# Patient Record
Sex: FEMALE | Race: WHITE | NOT HISPANIC OR LATINO | Employment: OTHER | URBAN - METROPOLITAN AREA
[De-identification: names, ages, dates, MRNs, and addresses within clinical notes are randomized per-mention and may not be internally consistent; named-entity substitution may affect disease eponyms.]

---

## 2024-04-19 ENCOUNTER — TELEPHONE (OUTPATIENT)
Dept: CARDIOLOGY | Facility: HOSPITAL | Age: 74
End: 2024-04-19
Payer: MEDICARE

## 2024-04-26 NOTE — TELEPHONE ENCOUNTER
Called and notified pt June appt is the soonest Dr. Chamorro can see her, advised if elevated BPs become symptomatic with headaches, dizzines, stroke-like symptoms then she should proceed to ED immediately for evaluation, otherwise for pt to keep a log of daily BPs and bring it with her to June 10th appt for Dr. Chamorro to address. And call us in the mean time if she has further questions or concerns.

## 2024-05-02 ENCOUNTER — OFFICE VISIT (OUTPATIENT)
Dept: PRIMARY CARE | Facility: CLINIC | Age: 74
End: 2024-05-02
Payer: MEDICARE

## 2024-05-02 VITALS
HEART RATE: 71 BPM | SYSTOLIC BLOOD PRESSURE: 124 MMHG | OXYGEN SATURATION: 98 % | BODY MASS INDEX: 41.15 KG/M2 | WEIGHT: 247 LBS | DIASTOLIC BLOOD PRESSURE: 88 MMHG | TEMPERATURE: 97.3 F | HEIGHT: 65 IN

## 2024-05-02 DIAGNOSIS — L98.9 SKIN LESION: ICD-10-CM

## 2024-05-02 DIAGNOSIS — E03.8 OTHER SPECIFIED HYPOTHYROIDISM: ICD-10-CM

## 2024-05-02 DIAGNOSIS — E78.2 MIXED HYPERLIPIDEMIA: ICD-10-CM

## 2024-05-02 DIAGNOSIS — E66.01 MORBID (SEVERE) OBESITY DUE TO EXCESS CALORIES (MULTI): ICD-10-CM

## 2024-05-02 DIAGNOSIS — E55.9 VITAMIN D DEFICIENCY: ICD-10-CM

## 2024-05-02 DIAGNOSIS — R73.09 ABNORMAL GLUCOSE: ICD-10-CM

## 2024-05-02 DIAGNOSIS — Z11.59 NEED FOR HEPATITIS C SCREENING TEST: ICD-10-CM

## 2024-05-02 DIAGNOSIS — I48.0 PAROXYSMAL ATRIAL FIBRILLATION (MULTI): Primary | ICD-10-CM

## 2024-05-02 PROBLEM — Z98.890 HISTORY OF CARDIAC RADIOFREQUENCY ABLATION: Status: ACTIVE | Noted: 2024-05-02

## 2024-05-02 PROCEDURE — 1160F RVW MEDS BY RX/DR IN RCRD: CPT | Performed by: INTERNAL MEDICINE

## 2024-05-02 PROCEDURE — 1036F TOBACCO NON-USER: CPT | Performed by: INTERNAL MEDICINE

## 2024-05-02 PROCEDURE — 99204 OFFICE O/P NEW MOD 45 MIN: CPT | Performed by: INTERNAL MEDICINE

## 2024-05-02 PROCEDURE — 1159F MED LIST DOCD IN RCRD: CPT | Performed by: INTERNAL MEDICINE

## 2024-05-02 RX ORDER — BUPROPION HYDROCHLORIDE 200 MG/1
1 TABLET, EXTENDED RELEASE ORAL 2 TIMES DAILY
COMMUNITY

## 2024-05-02 RX ORDER — CARISOPRODOL 350 MG/1
TABLET ORAL EVERY 6 HOURS
COMMUNITY

## 2024-05-02 RX ORDER — THYROID 60 MG/1
60 TABLET ORAL
COMMUNITY

## 2024-05-02 RX ORDER — DIAZEPAM 5 MG/1
5 TABLET ORAL 2 TIMES DAILY PRN
COMMUNITY
Start: 2023-12-03 | End: 2024-05-17 | Stop reason: WASHOUT

## 2024-05-02 RX ORDER — GUAIFENESIN 600 MG/1
TABLET, EXTENDED RELEASE ORAL EVERY 12 HOURS
COMMUNITY

## 2024-05-02 RX ORDER — CARVEDILOL 6.25 MG/1
6.25 TABLET ORAL
COMMUNITY
Start: 2024-04-03

## 2024-05-02 RX ORDER — DIPHENHYDRAMINE HCL 25 MG
TABLET ORAL
COMMUNITY

## 2024-05-02 RX ORDER — CLONAZEPAM 0.5 MG/1
0.25 TABLET ORAL NIGHTLY
COMMUNITY

## 2024-05-02 RX ORDER — MELATONIN 5 MG
CAPSULE ORAL EVERY 24 HOURS
COMMUNITY

## 2024-05-02 RX ORDER — LEVOCETIRIZINE DIHYDROCHLORIDE 5 MG/1
TABLET, FILM COATED ORAL EVERY 24 HOURS
COMMUNITY

## 2024-05-02 RX ORDER — DULOXETIN HYDROCHLORIDE 60 MG/1
1 CAPSULE, DELAYED RELEASE ORAL DAILY
COMMUNITY
Start: 2013-09-25

## 2024-05-02 RX ORDER — LIDOCAINE 560 MG/1
PATCH PERCUTANEOUS; TOPICAL; TRANSDERMAL
COMMUNITY
Start: 2023-12-03 | End: 2025-12-02

## 2024-05-02 RX ORDER — NALOXONE HYDROCHLORIDE 4 MG/.1ML
SPRAY NASAL
COMMUNITY
Start: 2023-12-03 | End: 2025-12-02

## 2024-05-02 RX ORDER — NYSTATIN 100000 [USP'U]/G
POWDER TOPICAL
COMMUNITY

## 2024-05-02 RX ORDER — FAMOTIDINE 20 MG/1
20 TABLET, FILM COATED ORAL
COMMUNITY

## 2024-05-02 RX ORDER — KETOROLAC TROMETHAMINE 10 MG/1
10 TABLET, FILM COATED ORAL EVERY 8 HOURS PRN
COMMUNITY
Start: 2022-05-28

## 2024-05-02 RX ORDER — ACETAMINOPHEN 500 MG
TABLET ORAL
COMMUNITY
Start: 2023-12-03 | End: 2025-12-02

## 2024-05-02 RX ORDER — FLUTICASONE PROPIONATE 50 MCG
SPRAY, SUSPENSION (ML) NASAL EVERY 24 HOURS
COMMUNITY
Start: 2021-08-26

## 2024-05-02 RX ORDER — MEDIUM CHAIN TRIGLYCERIDES 7.7KCAL/ML
OIL (ML) ORAL
COMMUNITY

## 2024-05-02 ASSESSMENT — ENCOUNTER SYMPTOMS
NUMBNESS: 0
COUGH: 0
VOMITING: 0
FEVER: 0
DIARRHEA: 0
PHOTOPHOBIA: 0
DIZZINESS: 0
FACIAL ASYMMETRY: 0
TROUBLE SWALLOWING: 0
SEIZURES: 0
EYE PAIN: 0
SINUS PAIN: 0
POLYPHAGIA: 0
ARTHRALGIAS: 0
HALLUCINATIONS: 0
SPEECH DIFFICULTY: 0
CONFUSION: 0
APPETITE CHANGE: 0
NAUSEA: 0
BACK PAIN: 0
STRIDOR: 0
SORE THROAT: 0
CONSTIPATION: 0
VOICE CHANGE: 0
NERVOUS/ANXIOUS: 0
NECK PAIN: 0
WHEEZING: 0
PALPITATIONS: 0
ABDOMINAL PAIN: 0
MYALGIAS: 0
HEMATURIA: 0
DYSURIA: 0
WOUND: 0
FATIGUE: 0
TREMORS: 0
HEADACHES: 0
JOINT SWELLING: 0
UNEXPECTED WEIGHT CHANGE: 0
CHEST TIGHTNESS: 0
BLOOD IN STOOL: 0
SHORTNESS OF BREATH: 0
SLEEP DISTURBANCE: 0
FREQUENCY: 0
COLOR CHANGE: 0
ACTIVITY CHANGE: 0
FLANK PAIN: 0
POLYDIPSIA: 0
WEAKNESS: 0
ADENOPATHY: 0

## 2024-05-02 NOTE — TELEPHONE ENCOUNTER
Patient contacted cardiology nursing office to provide update patient has returned to [place], had office visit with Dr. Lou today and blood pressure was 124/72.  Patient will maintain scheduled follow up 6/10/24.

## 2024-05-02 NOTE — ASSESSMENT & PLAN NOTE
Patient obesity is linked to genetic factor but she is able to maintain her weight and is doing effort to lose weight.

## 2024-05-02 NOTE — PROGRESS NOTES
Subjective   Patient ID: Mckayla Barron is a 74 y.o. female who presents for New Patient Visit (Concerns about hypertension ).    HPI   I am seeing patient first time for the concerns of Hypertension. She was in [place] for 18 months as her daughter had health issues and thankfully she is doing better. Patient developed mountain sickness and it increased her SBP to 200 but after returning back her BP is better and in the office today its 124/88.    Patient also have other problems and is on several different medications.    She also want to see a dermatology for skin lesion which lately raised.    Review of Systems   Constitutional:  Negative for activity change, appetite change, fatigue, fever and unexpected weight change.   HENT:  Negative for dental problem, ear discharge, hearing loss, nosebleeds, postnasal drip, sinus pain, sore throat, trouble swallowing and voice change.    Eyes:  Negative for photophobia, pain and visual disturbance.   Respiratory:  Negative for cough, chest tightness, shortness of breath, wheezing and stridor.    Cardiovascular:  Negative for chest pain, palpitations and leg swelling.   Gastrointestinal:  Negative for abdominal pain, blood in stool, constipation, diarrhea, nausea and vomiting.   Endocrine: Negative for polydipsia, polyphagia and polyuria.   Genitourinary:  Negative for decreased urine volume, dyspareunia, dysuria, flank pain, frequency, hematuria and urgency.   Musculoskeletal:  Negative for arthralgias, back pain, gait problem, joint swelling, myalgias and neck pain.   Skin:  Negative for color change, rash and wound.   Allergic/Immunologic: Negative for environmental allergies and food allergies.   Neurological:  Negative for dizziness, tremors, seizures, syncope, facial asymmetry, speech difficulty, weakness, numbness and headaches.   Hematological:  Negative for adenopathy.   Psychiatric/Behavioral:  Negative for behavioral problems, confusion, hallucinations,  "self-injury, sleep disturbance and suicidal ideas. The patient is not nervous/anxious.      Objective   /88   Pulse 71   Temp 36.3 °C (97.3 °F)   Ht 1.638 m (5' 4.5\")   Wt 112 kg (247 lb)   SpO2 98%   BMI 41.74 kg/m²     Physical Exam  Vitals and nursing note reviewed.   Constitutional:       General: She is not in acute distress.     Appearance: Normal appearance. She is not ill-appearing or toxic-appearing.   HENT:      Head: Normocephalic and atraumatic.      Nose: Nose normal.   Eyes:      General:         Right eye: No discharge.         Left eye: No discharge.      Extraocular Movements: Extraocular movements intact.      Conjunctiva/sclera: Conjunctivae normal.      Pupils: Pupils are equal, round, and reactive to light.   Cardiovascular:      Rate and Rhythm: Normal rate and regular rhythm.      Pulses: Normal pulses.      Heart sounds: Normal heart sounds. No murmur heard.     No gallop.   Pulmonary:      Effort: Pulmonary effort is normal. No respiratory distress.      Breath sounds: Normal breath sounds. No stridor. No wheezing or rales.   Abdominal:      General: Bowel sounds are normal. There is no distension.      Palpations: Abdomen is soft.      Tenderness: There is no abdominal tenderness. There is no right CVA tenderness, left CVA tenderness, guarding or rebound.   Musculoskeletal:         General: No swelling or deformity. Normal range of motion.      Cervical back: Normal range of motion and neck supple. No rigidity or tenderness.      Right lower leg: No edema.      Left lower leg: No edema.   Skin:     General: Skin is warm.      Coloration: Skin is not jaundiced.      Findings: No bruising, erythema or rash.   Neurological:      General: No focal deficit present.      Mental Status: She is alert and oriented to person, place, and time.      Cranial Nerves: No cranial nerve deficit.      Gait: Gait normal.   Psychiatric:         Mood and Affect: Mood normal.         Behavior: " Behavior normal.         Thought Content: Thought content normal.         Judgment: Judgment normal.       Assessment/Plan   Problem List Items Addressed This Visit          Cardiac and Vasculature    Paroxysmal atrial fibrillation (Multi) - Primary    Relevant Medications    carvedilol (Coreg) 6.25 mg tablet    rivaroxaban (Xarelto) 20 mg tablet    Other Relevant Orders    CBC and Auto Differential    Comprehensive Metabolic Panel    Mixed hyperlipidemia    Relevant Orders    Lipid Panel       Endocrine/Metabolic    Morbid (severe) obesity due to excess calories (Multi)     Patient obesity is linked to genetic factor but she is able to maintain her weight and is doing effort to lose weight.         Other specified hypothyroidism    Relevant Medications    thyroid, pork, (Linden) 60 mg tablet    Other Relevant Orders    TSH with reflex to Free T4 if abnormal     Other Visit Diagnoses       Skin lesion        Relevant Orders    Referral to Dermatology    Abnormal glucose        Relevant Orders    Hemoglobin A1C    Vitamin D deficiency        Relevant Orders    Vitamin D 25-Hydroxy,Total (for eval of Vitamin D levels)    Need for hepatitis C screening test        Relevant Orders    Hepatitis C Antibody

## 2024-05-03 ENCOUNTER — LAB (OUTPATIENT)
Dept: LAB | Facility: LAB | Age: 74
End: 2024-05-03
Payer: MEDICARE

## 2024-05-03 DIAGNOSIS — I48.0 PAROXYSMAL ATRIAL FIBRILLATION (MULTI): ICD-10-CM

## 2024-05-03 DIAGNOSIS — R73.09 ABNORMAL GLUCOSE: ICD-10-CM

## 2024-05-03 DIAGNOSIS — E78.2 MIXED HYPERLIPIDEMIA: ICD-10-CM

## 2024-05-03 DIAGNOSIS — E55.9 VITAMIN D DEFICIENCY: ICD-10-CM

## 2024-05-03 DIAGNOSIS — Z11.59 NEED FOR HEPATITIS C SCREENING TEST: ICD-10-CM

## 2024-05-03 DIAGNOSIS — E03.8 OTHER SPECIFIED HYPOTHYROIDISM: ICD-10-CM

## 2024-05-03 LAB
25(OH)D3 SERPL-MCNC: 46 NG/ML (ref 30–100)
ALBUMIN SERPL BCP-MCNC: 4.3 G/DL (ref 3.4–5)
ALP SERPL-CCNC: 72 U/L (ref 33–136)
ALT SERPL W P-5'-P-CCNC: 15 U/L (ref 7–45)
ANION GAP SERPL CALC-SCNC: 13 MMOL/L (ref 10–20)
AST SERPL W P-5'-P-CCNC: 21 U/L (ref 9–39)
BASOPHILS # BLD AUTO: 0.04 X10*3/UL (ref 0–0.1)
BASOPHILS NFR BLD AUTO: 0.8 %
BILIRUB SERPL-MCNC: 0.7 MG/DL (ref 0–1.2)
BUN SERPL-MCNC: 19 MG/DL (ref 6–23)
CALCIUM SERPL-MCNC: 9.6 MG/DL (ref 8.6–10.3)
CHLORIDE SERPL-SCNC: 103 MMOL/L (ref 98–107)
CHOLEST SERPL-MCNC: 282 MG/DL (ref 0–199)
CHOLESTEROL/HDL RATIO: 7
CO2 SERPL-SCNC: 30 MMOL/L (ref 21–32)
CREAT SERPL-MCNC: 0.93 MG/DL (ref 0.5–1.05)
EGFRCR SERPLBLD CKD-EPI 2021: 64 ML/MIN/1.73M*2
EOSINOPHIL # BLD AUTO: 0.16 X10*3/UL (ref 0–0.4)
EOSINOPHIL NFR BLD AUTO: 3.2 %
ERYTHROCYTE [DISTWIDTH] IN BLOOD BY AUTOMATED COUNT: 13 % (ref 11.5–14.5)
EST. AVERAGE GLUCOSE BLD GHB EST-MCNC: 137 MG/DL
GLUCOSE SERPL-MCNC: 148 MG/DL (ref 74–99)
HBA1C MFR BLD: 6.4 %
HCT VFR BLD AUTO: 46.6 % (ref 36–46)
HCV AB SER QL: NONREACTIVE
HDLC SERPL-MCNC: 40.1 MG/DL
HGB BLD-MCNC: 15 G/DL (ref 12–16)
IMM GRANULOCYTES # BLD AUTO: 0.01 X10*3/UL (ref 0–0.5)
IMM GRANULOCYTES NFR BLD AUTO: 0.2 % (ref 0–0.9)
LDLC SERPL CALC-MCNC: 181 MG/DL
LYMPHOCYTES # BLD AUTO: 1.94 X10*3/UL (ref 0.8–3)
LYMPHOCYTES NFR BLD AUTO: 38.9 %
MCH RBC QN AUTO: 30.2 PG (ref 26–34)
MCHC RBC AUTO-ENTMCNC: 32.2 G/DL (ref 32–36)
MCV RBC AUTO: 94 FL (ref 80–100)
MONOCYTES # BLD AUTO: 0.47 X10*3/UL (ref 0.05–0.8)
MONOCYTES NFR BLD AUTO: 9.4 %
NEUTROPHILS # BLD AUTO: 2.37 X10*3/UL (ref 1.6–5.5)
NEUTROPHILS NFR BLD AUTO: 47.5 %
NON HDL CHOLESTEROL: 242 MG/DL (ref 0–149)
NRBC BLD-RTO: 0 /100 WBCS (ref 0–0)
PLATELET # BLD AUTO: 243 X10*3/UL (ref 150–450)
POTASSIUM SERPL-SCNC: 4.1 MMOL/L (ref 3.5–5.3)
PROT SERPL-MCNC: 6.6 G/DL (ref 6.4–8.2)
RBC # BLD AUTO: 4.97 X10*6/UL (ref 4–5.2)
SODIUM SERPL-SCNC: 142 MMOL/L (ref 136–145)
TRIGL SERPL-MCNC: 303 MG/DL (ref 0–149)
TSH SERPL-ACNC: 1.85 MIU/L (ref 0.44–3.98)
VLDL: 61 MG/DL (ref 0–40)
WBC # BLD AUTO: 5 X10*3/UL (ref 4.4–11.3)

## 2024-05-03 PROCEDURE — 80061 LIPID PANEL: CPT

## 2024-05-03 PROCEDURE — 82306 VITAMIN D 25 HYDROXY: CPT

## 2024-05-03 PROCEDURE — 83036 HEMOGLOBIN GLYCOSYLATED A1C: CPT

## 2024-05-03 PROCEDURE — 80053 COMPREHEN METABOLIC PANEL: CPT

## 2024-05-03 PROCEDURE — 84443 ASSAY THYROID STIM HORMONE: CPT

## 2024-05-03 PROCEDURE — 86803 HEPATITIS C AB TEST: CPT

## 2024-05-03 PROCEDURE — 36415 COLL VENOUS BLD VENIPUNCTURE: CPT

## 2024-05-03 PROCEDURE — 85025 COMPLETE CBC W/AUTO DIFF WBC: CPT

## 2024-05-17 ENCOUNTER — OFFICE VISIT (OUTPATIENT)
Dept: PRIMARY CARE | Facility: CLINIC | Age: 74
End: 2024-05-17
Payer: MEDICARE

## 2024-05-17 VITALS
HEART RATE: 80 BPM | TEMPERATURE: 97.1 F | OXYGEN SATURATION: 98 % | DIASTOLIC BLOOD PRESSURE: 84 MMHG | SYSTOLIC BLOOD PRESSURE: 124 MMHG

## 2024-05-17 DIAGNOSIS — R42 DIZZINESS: ICD-10-CM

## 2024-05-17 DIAGNOSIS — J30.2 SEASONAL ALLERGIES: ICD-10-CM

## 2024-05-17 DIAGNOSIS — R73.03 PRE-DIABETES: Primary | ICD-10-CM

## 2024-05-17 DIAGNOSIS — E78.2 MIXED HYPERLIPIDEMIA: ICD-10-CM

## 2024-05-17 PROCEDURE — 1160F RVW MEDS BY RX/DR IN RCRD: CPT | Performed by: INTERNAL MEDICINE

## 2024-05-17 PROCEDURE — 99214 OFFICE O/P EST MOD 30 MIN: CPT | Performed by: INTERNAL MEDICINE

## 2024-05-17 PROCEDURE — 1036F TOBACCO NON-USER: CPT | Performed by: INTERNAL MEDICINE

## 2024-05-17 PROCEDURE — 1159F MED LIST DOCD IN RCRD: CPT | Performed by: INTERNAL MEDICINE

## 2024-05-17 RX ORDER — DICYCLOMINE HYDROCHLORIDE 10 MG/1
CAPSULE ORAL
COMMUNITY
Start: 2024-05-03

## 2024-05-17 RX ORDER — EZETIMIBE 10 MG/1
10 TABLET ORAL DAILY
Qty: 30 TABLET | Refills: 5 | Status: SHIPPED | OUTPATIENT
Start: 2024-05-17 | End: 2024-11-13

## 2024-05-17 RX ORDER — ALBUTEROL SULFATE 90 UG/1
2 AEROSOL, METERED RESPIRATORY (INHALATION) EVERY 4 HOURS PRN
Qty: 8 G | Refills: 2 | Status: SHIPPED | OUTPATIENT
Start: 2024-05-17 | End: 2025-05-17

## 2024-05-17 ASSESSMENT — ENCOUNTER SYMPTOMS
TREMORS: 0
WEAKNESS: 0
BACK PAIN: 0
PALPITATIONS: 0
VOMITING: 0
NUMBNESS: 0
SHORTNESS OF BREATH: 0
NAUSEA: 0
FACIAL ASYMMETRY: 0
CONFUSION: 0
SORE THROAT: 0
SLEEP DISTURBANCE: 0
HEADACHES: 1
COUGH: 0
ADENOPATHY: 0
FLANK PAIN: 0
STRIDOR: 0
DIARRHEA: 0
FATIGUE: 0
WHEEZING: 1
CONSTIPATION: 0
FREQUENCY: 0
COLOR CHANGE: 0
POLYPHAGIA: 0
ARTHRALGIAS: 0
TROUBLE SWALLOWING: 0
RHINORRHEA: 1
NECK PAIN: 0
SPEECH DIFFICULTY: 0
MYALGIAS: 0
SINUS PAIN: 0
ACTIVITY CHANGE: 0
DYSURIA: 0
NERVOUS/ANXIOUS: 0
ABDOMINAL PAIN: 0
FEVER: 0
PHOTOPHOBIA: 0
WOUND: 0
CHEST TIGHTNESS: 0
SEIZURES: 0
UNEXPECTED WEIGHT CHANGE: 0
BLOOD IN STOOL: 0
JOINT SWELLING: 0
POLYDIPSIA: 0
APPETITE CHANGE: 0
DIZZINESS: 1
HALLUCINATIONS: 0
HEMATURIA: 0
VOICE CHANGE: 0
EYE PAIN: 0

## 2024-05-17 NOTE — PROGRESS NOTES
Subjective   Patient ID: Mckayla Barron is a 74 y.o. female who presents for Follow-up and Headache.    HPI   Patient presented to the office for follow up on labs.  She has    Mixed hyperlipidemia with LDL level of 181 and TG of 303. Last LDL level was 114 checked 4 years ago.  There is a sharp increase and patient is not on any medicine. She said she cannot tolerate statin because of side effects but is ok with alternative.    Her hemoglobin A1c level is 6.4    She is also experiencing symptoms of seasonal allergies and used her  albuterol inhaler last night.    Review of Systems   Constitutional:  Negative for activity change, appetite change, fatigue, fever and unexpected weight change.   HENT:  Positive for congestion and rhinorrhea. Negative for dental problem, ear discharge, hearing loss, nosebleeds, postnasal drip, sinus pain, sore throat, trouble swallowing and voice change.    Eyes:  Negative for photophobia, pain and visual disturbance.   Respiratory:  Positive for wheezing. Negative for cough, chest tightness, shortness of breath and stridor.    Cardiovascular:  Negative for chest pain, palpitations and leg swelling.   Gastrointestinal:  Negative for abdominal pain, blood in stool, constipation, diarrhea, nausea and vomiting.   Endocrine: Negative for polydipsia, polyphagia and polyuria.   Genitourinary:  Negative for decreased urine volume, dyspareunia, dysuria, flank pain, frequency, hematuria and urgency.   Musculoskeletal:  Negative for arthralgias, back pain, gait problem, joint swelling, myalgias and neck pain.   Skin:  Negative for color change, rash and wound.   Allergic/Immunologic: Negative for environmental allergies and food allergies.   Neurological:  Positive for dizziness and headaches. Negative for tremors, seizures, syncope, facial asymmetry, speech difficulty, weakness and numbness.   Hematological:  Negative for adenopathy.   Psychiatric/Behavioral:  Negative for behavioral  problems, confusion, hallucinations, self-injury, sleep disturbance and suicidal ideas. The patient is not nervous/anxious.      Objective   /84   Pulse 80   Temp 36.2 °C (97.1 °F)   SpO2 98%     Physical Exam  Vitals and nursing note reviewed.   Constitutional:       General: She is not in acute distress.     Appearance: She is obese. She is not ill-appearing or toxic-appearing.   HENT:      Head: Normocephalic.      Nose: Nose normal. No congestion.   Eyes:      General:         Right eye: No discharge.         Left eye: No discharge.      Conjunctiva/sclera: Conjunctivae normal.      Pupils: Pupils are equal, round, and reactive to light.   Cardiovascular:      Rate and Rhythm: Normal rate and regular rhythm.      Pulses: Normal pulses.      Heart sounds: Normal heart sounds. No murmur heard.  Pulmonary:      Effort: Pulmonary effort is normal. No respiratory distress.      Breath sounds: No stridor. Wheezing present. No rhonchi or rales.   Musculoskeletal:         General: No swelling or deformity. Normal range of motion.      Cervical back: Normal range of motion.   Skin:     General: Skin is warm.      Coloration: Skin is not jaundiced.      Findings: No erythema or rash.   Neurological:      General: No focal deficit present.      Mental Status: She is alert and oriented to person, place, and time.      Cranial Nerves: No cranial nerve deficit.      Gait: Gait normal.   Psychiatric:         Mood and Affect: Mood normal.         Behavior: Behavior normal.         Thought Content: Thought content normal.         Judgment: Judgment normal.       Assessment/Plan   Problem List Items Addressed This Visit          Cardiac and Vasculature    Mixed hyperlipidemia     Lifestyle modification as discussed.         Relevant Medications    ezetimibe (Zetia) 10 mg tablet    Other Relevant Orders    Lipid Panel       ENT    Seasonal allergies    Relevant Medications    albuterol (Ventolin HFA) 90 mcg/actuation  inhaler       Endocrine/Metabolic    Pre-diabetes - Primary     Dietary control, weight loss and daily exercise as discussed. Check your A1c level again in 4 months.         Relevant Orders    Hemoglobin A1C       Symptoms and Signs    Dizziness    Relevant Orders    Vascular US carotid artery duplex bilateral

## 2024-05-17 NOTE — ASSESSMENT & PLAN NOTE
Dietary control, weight loss and daily exercise as discussed. Check your A1c level again in 4 months.

## 2024-05-21 ENCOUNTER — HOSPITAL ENCOUNTER (OUTPATIENT)
Dept: VASCULAR MEDICINE | Facility: HOSPITAL | Age: 74
Discharge: HOME | End: 2024-05-21
Payer: MEDICARE

## 2024-05-21 DIAGNOSIS — R42 DIZZINESS: ICD-10-CM

## 2024-05-21 PROCEDURE — 93880 EXTRACRANIAL BILAT STUDY: CPT | Performed by: SURGERY

## 2024-05-21 PROCEDURE — 93880 EXTRACRANIAL BILAT STUDY: CPT

## 2024-05-28 RX ORDER — NAPROXEN SODIUM 220 MG
220 TABLET ORAL EVERY 12 HOURS
COMMUNITY

## 2024-05-28 RX ORDER — CETIRIZINE HYDROCHLORIDE 10 MG/1
TABLET ORAL EVERY 24 HOURS
COMMUNITY

## 2024-05-28 RX ORDER — RISPERIDONE 0.25 MG/1
0.25 TABLET ORAL 2 TIMES DAILY
COMMUNITY

## 2024-05-28 RX ORDER — TIZANIDINE 2 MG/1
2 TABLET ORAL EVERY 8 HOURS PRN
COMMUNITY
Start: 2023-12-03 | End: 2025-12-02

## 2024-05-29 ENCOUNTER — OFFICE VISIT (OUTPATIENT)
Dept: CARDIOLOGY | Facility: CLINIC | Age: 74
End: 2024-05-29
Payer: MEDICARE

## 2024-05-29 VITALS
DIASTOLIC BLOOD PRESSURE: 81 MMHG | HEIGHT: 66 IN | OXYGEN SATURATION: 97 % | WEIGHT: 253.31 LBS | HEART RATE: 65 BPM | SYSTOLIC BLOOD PRESSURE: 115 MMHG | BODY MASS INDEX: 40.71 KG/M2

## 2024-05-29 DIAGNOSIS — I48.0 PAROXYSMAL ATRIAL FIBRILLATION (MULTI): Primary | ICD-10-CM

## 2024-05-29 PROCEDURE — 93005 ELECTROCARDIOGRAM TRACING: CPT | Performed by: INTERNAL MEDICINE

## 2024-05-29 PROCEDURE — 99214 OFFICE O/P EST MOD 30 MIN: CPT | Performed by: INTERNAL MEDICINE

## 2024-05-29 ASSESSMENT — COLUMBIA-SUICIDE SEVERITY RATING SCALE - C-SSRS
1. IN THE PAST MONTH, HAVE YOU WISHED YOU WERE DEAD OR WISHED YOU COULD GO TO SLEEP AND NOT WAKE UP?: NO
6. HAVE YOU EVER DONE ANYTHING, STARTED TO DO ANYTHING, OR PREPARED TO DO ANYTHING TO END YOUR LIFE?: NO
2. HAVE YOU ACTUALLY HAD ANY THOUGHTS OF KILLING YOURSELF?: NO

## 2024-05-29 ASSESSMENT — PAIN SCALES - GENERAL: PAINLEVEL: 4

## 2024-05-29 NOTE — PROGRESS NOTES
Primary Care Physician: Robinson Lou MD  Date of Visit: 05/29/2024  1:00 PM EDT  Location of visit: [facility] [place] [address]     Chief Complaint:   No chief complaint on file.       HPI / Summary:   Mckayla Barron is a 74 y.o. female presents for followup.     2011 PVI, elevated BMI, htn, dl, depresssion, PTSD, anxiety, KESHA, lumbar radic, tremor, developed elevated bp at altitude in [place] . Went to er told to get off mountain. Watch shows 2-3 % afib/week.  Recent labs reviewed, lipids worsened, started on ezetimibe, adverse rxn to 4-5 statins.  A1c 6.4  Specialty Problems          Cardiology Problems    History of cardiac radiofrequency ablation    Mixed hyperlipidemia    Paroxysmal atrial fibrillation (Multi)        Past Medical History:   Diagnosis Date    Abnormal weight gain     Weight gain    Depression, unspecified 01/05/2015    Depression    Disorder of thyroid, unspecified     Thyroid trouble    Hypertension     Other specified hypothyroidism 01/05/2015    Secondary hypothyroidism    Personal history of other diseases of the circulatory system     History of cardiac disorder    Personal history of other diseases of the digestive system     History of dental problems    Personal history of other diseases of the nervous system and sense organs     History of tinnitus    Tremor, unspecified     Occasional tremors          Past Surgical History:   Procedure Laterality Date    ANKLE SURGERY  07/21/2014    Ankle Surgery    CHOLECYSTECTOMY  07/21/2014    Cholecystectomy    COLON SURGERY  09/28/2015    Colon Surgery    COLONOSCOPY W/ POLYPECTOMY  07/21/2014    Complete Colonoscopy For Polyp Removal    DILATION AND CURETTAGE OF UTERUS  07/21/2014    Dilation And Curettage    HERNIA REPAIR  07/21/2014    Inguinal Hernia Repair    HYSTERECTOMY  07/21/2014    Hysterectomy    KNEE SURGERY  07/21/2014    Knee Surgery    MOUTH SURGERY  07/21/2014    Oral Surgery Tooth Extraction    MR CHEST ANGIO W IV CONTRAST  3/14/2012    MR  CHEST ANGIO W IV CONTRAST 3[place]14/2012 [facility]    OTHER SURGICAL HISTORY  07/21/2014    Ant Spinal Diskect Osteophytect Lumb Interspace Microdiscect          Social History:   reports that she has never smoked. She has never used smokeless tobacco. She reports that she does not drink alcohol and does not use drugs.      Allergies:  Allergies   Allergen Reactions    Hydroxyzine Pamoate Shortness of breath    Mushroom Flavor Anaphylaxis     Mushroom allergy    Shellfish Derived Anaphylaxis    Acetaminophen Other     CAUSES LIVER TENDERNESS AND RARLEY TAKES IT    Liver tenderness    Aspirin Other     Increases tinnitus    Meperidine (Pf) Hallucinations     RESPIRATORY DIFFICULTIES, HALLUCINATIONS    Nsaids (Non-Steroidal Anti-Inflammatory Drug) Other     LIVER TENDERNESS AND SWELLING    Liver tenderness and swelling    Nystatin Other    Primidone Dizziness and Headache     Dizziness, nausea, headache    Statins-Hmg-Coa Reductase Inhibitors Myalgia     CAUSED LIVER TENDERNESS AND MUSCLE PAIN    Muscle spams and cramping    Codeine Rash and Hives     Rash, hallucination    Latex Other and Rash     Skin blisters    Oxycodone-Acetaminophen Other, Hallucinations, Hives and Rash     HALLUCINATIONS    Rash, hallucination    Povidone-Iodine Rash     BLISTERS    Red Dye Hives and Rash       Outpatient Medications:  Current Outpatient Medications   Medication Instructions    acetaminophen (Tylenol) 500 mg tablet Take 1 to 2 tablets by mouth every 6 hours as needed for pain or fever. Do not exceed 6 tablets in 24 hours    albuterol (Ventolin HFA) 90 mcg/actuation inhaler 2 puffs, inhalation, Every 4 hours PRN    BACILLUS COAGULANS-INULIN ORAL oral    buPROPion SR (Wellbutrin SR) 200 mg 12 hr tablet 1 tablet, oral, 2 times daily    carisoprodol (Soma) 350 mg tablet Every 6 hours    carvedilol (COREG) 6.25 mg, oral    cetirizine (ZyrTEC) 10 mg tablet oral, Every 24 hours    clonazePAM (KLONOPIN) 0.25 mg, oral, Nightly     dicyclomine (Bentyl) 10 mg capsule 1 CAP ORALLY 3 TIMES A DAY AS NEEDED FOR 30 DAY(S)    diphenhydrAMINE (Benadryl Allergy) 25 mg tablet oral    DULoxetine (Cymbalta) 60 mg DR capsule 1 capsule, oral, Daily    ezetimibe (ZETIA) 10 mg, oral, Daily    famotidine (PEPCID) 20 mg, oral    fluticasone (Flonase Allergy Relief) 50 mcg/actuation nasal spray Every 24 hours    guaiFENesin (Mucinex) 600 mg 12 hr tablet Every 12 hours    ketorolac (TORADOL) 10 mg, oral, Every 8 hours PRN    levocetirizine (Xyzal) 5 mg tablet Every 24 hours    lidocaine 4 % patch Apply 1 patch to the affected area(s) every 8 hours    medium chain triglycerides, MCT, oil (MCT Oil) 7.7 kcal/mL oil oral    melatonin 5 mg capsule Every 24 hours    naloxone (Narcan) 4 mg/0.1 mL nasal spray For Recipient Use. Use 1 Spray in one nostril. Seek immediate emergency medical help. If response is not obtained after 2 or 3 minutes, administer 2nd dose using new device in other nostril until emergency help arrives. Emergency use only    naproxen sodium (ALEVE) 220 mg, oral, Every 12 hours    nystatin (Mycostatin) 100,000 unit/gram powder APPLY TO THE AFFECTED AREA 3 TIMES DAILY.    risperiDONE (RISPERDAL) 0.25 mg, oral, 2 times daily    rivaroxaban (Xarelto) 20 mg tablet 1 tablet, oral, Daily    rivaroxaban (XARELTO) 20 mg, oral, Daily with evening meal, Take with food.    thyroid (pork) (ARMOUR) 60 mg, oral, Daily before breakfast    tiZANidine (ZANAFLEX) 2 mg, oral, Every 8 hours PRN       ROS     Physical Exam:  There were no vitals filed for this visit.  Wt Readings from Last 5 Encounters:   05/02/24 112 kg (247 lb)   08/29/22 108 kg (238 lb 6.4 oz)   08/11/22 107 kg (235 lb 6.4 oz)   07/15/22 107 kg (236 lb 12.8 oz)   06/28/22 109 kg (239 lb 8 oz)     There is no height or weight on file to calculate BMI.     Well developed well-nourished female.  Normal carotid upstrokes no bruits.  Flat JVP.  Manual cuff blood pressure 124/80.  Regular rhythm no gallop  "no murmur.  Clear lungs without crackles.  Soft abdomen without masses.  No dependent edema with intact pedal pulses  Last Labs:  CMP:  Recent Labs     05/03/24  0845 01/28/21 1946 11/09/20  1559 07/23/19  0905 06/21/18  1353    144 142 143 143   K 4.1 3.8 3.9 3.7 4.1    105 103 103 103   CO2 30 30 29 26 25   ANIONGAP 13 9 10 14 15   BUN 19 20 20 19 16   CREATININE 0.93 0.7 0.7 0.7 0.7   EGFR 64 88 88 88 88   GLUCOSE 148* 101* 110* 108* 101*     Recent Labs     05/03/24  0845 06/21/18  1353   ALBUMIN 4.3 4.0   ALKPHOS 72 83   ALT 15 19   AST 21 31   BILITOT 0.7 0.2     CBC:  Recent Labs     05/03/24  0845 01/28/21 1946 11/09/20  1559 07/23/19  0905 06/21/18  1353   WBC 5.0 5.1 6.2 4.8 4.8   HGB 15.0 13.7 14.3 13.4 13.5   HCT 46.6* 43.3 44.0 41.7 41.6    239 264 212 229   MCV 94 95.4 93.2 92.1 92.7     COAG:   Recent Labs     01/28/21 1946   INR 0.9     HEME/ENDO:  Recent Labs     05/03/24  0845 01/28/21 1946 07/23/19  0905 06/21/18  1353   IRONSAT  --  30.1  --   --    TSH 1.85 1.18 2.43 1.16   HGBA1C 6.4*  --   --   --       CARDIAC: No results for input(s): \"LDH\", \"CKMB\", \"TROPHS\", \"BNP\" in the last 87264 hours.    No lab exists for component: \"CK\", \"CKMBP\"  Recent Labs     05/03/24 0845 07/23/19  0905   CHOL 282* 222*   HDL 40.1 42*   TRIG 303* 328*       Last Cardiology Tests:  ECG:  ECG: Normal records sinus rhythm    Echo:  Echo Results:  No results found for this or any previous visit from the past 3650 days.       Cath:      Stress Test:  Stress Results:  No results found for this or any previous visit from the past 365 days.         Cardiac Imaging:  Vascular US carotid artery duplex bilateral            [place] [address]   Tel [phone] and Fax [phone]       [facility] Lab Report  [facility]       Patient Name:      DAVE Capellan Physician: [id number] Ramsey"                                MD  Study Date:        5/21/2024          Ordering           [id number] NERI SOLIZ                                        Physician:  MRN/PID:           [id number]           Technologist:      Mae Rubio T  Accession#:        [id number]       Technologist 2:  Date of Birth/Age: 1950 / 74      Encounter#:        [id number]                     years  Gender:            F  Admission Status:  Outpatient         Location           [facility]                                        Performed:       Diagnosis/ICD: Dizziness and giddiness-R42  CPT Codes:     99194 Cerebrovascular Carotid Duplex scan complete       CONCLUSIONS:  Right Carotid: Findings are consistent with less than 50% stenosis of the right proximal internal carotid artery. Laminar flow seen by color Doppler. Right external carotid artery appears patent with no evidence of stenosis. The right vertebral artery is patent with antegrade flow. No evidence of hemodynamically significant stenosis in the right subclavian artery.  Left Carotid: Findings are consistent with less than 50% stenosis of the left proximal internal carotid artery. Laminar flow seen by color Doppler. Left external carotid artery appears patent with no evidence of stenosis. The left vertebral artery is patent with antegrade flow. No evidence of hemodynamically significant stenosis in the left subclavian artery.     Imaging & Doppler Findings:  Right Plaque Morph: The proximal right internal carotid artery demonstrates heterogenous plaque. The proximal right external carotid artery demonstrates heterogenous plaque. The distal right common carotid artery demonstrates heterogenous plaque.  Left Plaque Morph: The proximal left internal carotid artery demonstrates heterogenous plaque. The proximal left external carotid artery demonstrates heterogenous plaque. The distal left common carotid artery demonstrates heterogenous plaque.      Right                         Left    PSV      EDV                PSV      EDV  66 cm/s            CCA P    84 cm/s  52 cm/s            CCA D    59 cm/s  44 cm/s  13 cm/s   ICA P    53 cm/s  13 cm/s  59 cm/s            ICA M    76 cm/s  63 cm/s            ICA D    70 cm/s  84 cm/s             ECA     79 cm/s  51 cm/s          Vertebral  33 cm/s  141 cm/s         Subclavian 178 cm/s                  Right Left  ICA/CCA Ratio  0.8  0.9          [id number] Naya Ramsey MD  Electronically signed by [id number] Naya Ramsey MD on 5/22/2024 at 6:27:12 PM       ** Final **        Assessment/Plan       74-year-old female with elevated BMI of 42, depression, A1c of about 6.4, dyslipidemia, statin intolerance, PAF on Xarelto.  Continue current medical treatment occluding the addition of ezetimibe.  May benefit from GLP-1.  Will see her again in 6 months    Orders:  No orders of the defined types were placed in this encounter.     Followup Appts:  Future Appointments   Date Time Provider Department Center   9/4/2024  9:30 AM Robinson Lou MD [id number]           ____________________________________________________________  Jorden Chamorro MD    Senior Attending Physician  [facility] [place]  [facility]

## 2024-06-03 ENCOUNTER — TELEMEDICINE (OUTPATIENT)
Dept: PRIMARY CARE | Facility: CLINIC | Age: 74
End: 2024-06-03
Payer: MEDICARE

## 2024-06-03 DIAGNOSIS — G44.201 ACUTE INTRACTABLE TENSION-TYPE HEADACHE: Primary | ICD-10-CM

## 2024-06-03 LAB
ATRIAL RATE: 65 BPM
P AXIS: 91 DEGREES
P OFFSET: 158 MS
P ONSET: 121 MS
PR INTERVAL: 202 MS
Q ONSET: 222 MS
QRS COUNT: 11 BEATS
QRS DURATION: 88 MS
QT INTERVAL: 454 MS
QTC CALCULATION(BAZETT): 472 MS
QTC FREDERICIA: 466 MS
R AXIS: 89 DEGREES
T AXIS: 84 DEGREES
T OFFSET: 449 MS
VENTRICULAR RATE: 65 BPM

## 2024-06-03 PROCEDURE — 1160F RVW MEDS BY RX/DR IN RCRD: CPT | Performed by: INTERNAL MEDICINE

## 2024-06-03 PROCEDURE — 1159F MED LIST DOCD IN RCRD: CPT | Performed by: INTERNAL MEDICINE

## 2024-06-03 PROCEDURE — 99212 OFFICE O/P EST SF 10 MIN: CPT | Performed by: INTERNAL MEDICINE

## 2024-06-03 RX ORDER — SUMATRIPTAN 50 MG/1
50 TABLET, FILM COATED ORAL ONCE AS NEEDED
Qty: 27 TABLET | Refills: 3 | Status: SHIPPED | OUTPATIENT
Start: 2024-06-03 | End: 2025-06-03

## 2024-06-03 ASSESSMENT — ENCOUNTER SYMPTOMS
HALLUCINATIONS: 0
NUMBNESS: 0
MYALGIAS: 0
APPETITE CHANGE: 0
VOMITING: 0
DIZZINESS: 0
NERVOUS/ANXIOUS: 0
NECK PAIN: 0
SORE THROAT: 0
PHOTOPHOBIA: 0
EYE PAIN: 0
COLOR CHANGE: 0
WEAKNESS: 0
ACTIVITY CHANGE: 0
FREQUENCY: 0
FEVER: 0
WOUND: 0
SINUS PAIN: 0
CHEST TIGHTNESS: 0
POLYPHAGIA: 0
SEIZURES: 0
WHEEZING: 0
BACK PAIN: 0
FACIAL ASYMMETRY: 0
CONSTIPATION: 0
PALPITATIONS: 0
FLANK PAIN: 0
FATIGUE: 0
TROUBLE SWALLOWING: 0
TREMORS: 0
DYSURIA: 0
SPEECH DIFFICULTY: 0
HEADACHES: 1
JOINT SWELLING: 0
ADENOPATHY: 0
SLEEP DISTURBANCE: 0
ARTHRALGIAS: 0
COUGH: 0
STRIDOR: 0
POLYDIPSIA: 0
VOICE CHANGE: 0
SHORTNESS OF BREATH: 0
HEMATURIA: 0
NAUSEA: 0
CONFUSION: 0
BLOOD IN STOOL: 0
DIARRHEA: 0
UNEXPECTED WEIGHT CHANGE: 0
ABDOMINAL PAIN: 0

## 2024-06-03 NOTE — PROGRESS NOTES
Telemedicine visit is conducted with the patient with her consent about the medical problem as documented below.   Communication with the patient is face to face over the secured video call.    Subjective   Patient ID: Mckayla Barron is a 74 y.o. female who presents for Headache.    Headache   Pertinent negatives include no abdominal pain, back pain, coughing, dizziness, eye pain, fever, hearing loss, nausea, neck pain, numbness, photophobia, seizures, sore throat, vomiting or weakness.     Review of Systems   Constitutional:  Negative for activity change, appetite change, fatigue, fever and unexpected weight change.   HENT:  Negative for dental problem, ear discharge, hearing loss, nosebleeds, postnasal drip, sinus pain, sore throat, trouble swallowing and voice change.    Eyes:  Negative for photophobia, pain and visual disturbance.   Respiratory:  Negative for cough, chest tightness, shortness of breath, wheezing and stridor.    Cardiovascular:  Negative for chest pain, palpitations and leg swelling.   Gastrointestinal:  Negative for abdominal pain, blood in stool, constipation, diarrhea, nausea and vomiting.   Endocrine: Negative for polydipsia, polyphagia and polyuria.   Genitourinary:  Negative for decreased urine volume, dyspareunia, dysuria, flank pain, frequency, hematuria and urgency.   Musculoskeletal:  Negative for arthralgias, back pain, gait problem, joint swelling, myalgias and neck pain.   Skin:  Negative for color change, rash and wound.   Allergic/Immunologic: Negative for environmental allergies and food allergies.   Neurological:  Positive for headaches. Negative for dizziness, tremors, seizures, syncope, facial asymmetry, speech difficulty, weakness and numbness.   Hematological:  Negative for adenopathy.   Psychiatric/Behavioral:  Negative for behavioral problems, confusion, hallucinations, self-injury, sleep disturbance and suicidal ideas. The patient is not nervous/anxious.      Objective    There were no vitals taken for this visit.    Physical Exam  Virtual Physical Exam  Awake alert and oriented x 3. Following directions and replying to all questions appropriately.  No use of accessory muscle of respiration. No acute respiratory distress.  Moving all extremities.  Clear bilateral conjunctiva.  No visible skin rash over the face and extremities.  Joints movements of UE and bilateral knee is normal.  Normal mood with appropriate effect and emotions.    Rest of the physical exam is limited due to virtual nature of this visit.    Assessment/Plan   Problem List Items Addressed This Visit    None  Visit Diagnoses       Acute intractable tension-type headache    -  Primary    Relevant Medications    SUMAtriptan (Imitrex) 50 mg tablet

## 2024-06-10 ENCOUNTER — APPOINTMENT (OUTPATIENT)
Dept: CARDIOLOGY | Facility: CLINIC | Age: 74
End: 2024-06-10
Payer: MEDICARE

## 2024-06-11 DIAGNOSIS — G44.201 ACUTE INTRACTABLE TENSION-TYPE HEADACHE: Primary | ICD-10-CM

## 2024-06-12 ENCOUNTER — LAB (OUTPATIENT)
Dept: LAB | Facility: LAB | Age: 74
End: 2024-06-12
Payer: MEDICARE

## 2024-06-12 DIAGNOSIS — K52.9 NONINFECTIVE GASTROENTERITIS AND COLITIS, UNSPECIFIED: Primary | ICD-10-CM

## 2024-06-12 PROCEDURE — 82653 EL-1 FECAL QUANTITATIVE: CPT

## 2024-06-15 LAB — ELASTASE PANC STL-MCNT: 727 UG/G

## 2024-07-01 ENCOUNTER — APPOINTMENT (OUTPATIENT)
Dept: PRIMARY CARE | Facility: CLINIC | Age: 74
End: 2024-07-01
Payer: MEDICARE

## 2024-07-08 ENCOUNTER — APPOINTMENT (OUTPATIENT)
Dept: PRIMARY CARE | Facility: CLINIC | Age: 74
End: 2024-07-08
Payer: MEDICARE

## 2024-07-08 DIAGNOSIS — R26.89 BALANCE DISORDER: ICD-10-CM

## 2024-07-08 DIAGNOSIS — R42 VERTIGO: Primary | ICD-10-CM

## 2024-07-08 PROCEDURE — 1159F MED LIST DOCD IN RCRD: CPT | Performed by: INTERNAL MEDICINE

## 2024-07-08 PROCEDURE — 1160F RVW MEDS BY RX/DR IN RCRD: CPT | Performed by: INTERNAL MEDICINE

## 2024-07-08 PROCEDURE — 99213 OFFICE O/P EST LOW 20 MIN: CPT | Performed by: INTERNAL MEDICINE

## 2024-07-08 RX ORDER — MECLIZINE HYDROCHLORIDE 25 MG/1
25 TABLET ORAL 2 TIMES DAILY PRN
Qty: 28 TABLET | Refills: 0 | Status: SHIPPED | OUTPATIENT
Start: 2024-07-08 | End: 2024-07-22

## 2024-07-08 NOTE — PROGRESS NOTES
Telemedicine visit is conducted with the patient with her consent about the medical problem as documented below.   Communication with the patient is face to face over the secured video call.    Subjective   Patient ID: Mckayla Barron is a 74 y.o. female who presents for Vertigo.    HPI  Patient want to see [facility] for balance problem. She underwent physical therapy before and she felt good after PT.    She also need medicine for Vertigo.    Patient also need Naltrexone medication to be called to special compounding pharmacy called [facility] at [place].    Review of Systems   Constitutional:  Negative for activity change, appetite change, fatigue, fever and unexpected weight change.   HENT:  Negative for dental problem, ear discharge, hearing loss, nosebleeds, postnasal drip, sinus pain, sore throat, trouble swallowing and voice change.    Eyes:  Negative for photophobia, pain and visual disturbance.   Respiratory:  Negative for cough, chest tightness, shortness of breath, wheezing and stridor.    Cardiovascular:  Negative for chest pain, palpitations and leg swelling.   Gastrointestinal:  Negative for abdominal pain, blood in stool, constipation, diarrhea, nausea and vomiting.   Endocrine: Negative for polydipsia, polyphagia and polyuria.   Genitourinary:  Negative for decreased urine volume, dyspareunia, dysuria, flank pain, hematuria and urgency.   Musculoskeletal:  Positive for arthralgias. Negative for back pain, gait problem, joint swelling, myalgias and neck pain.   Skin:  Negative for color change, rash and wound.   Allergic/Immunologic: Negative for environmental allergies and food allergies.   Neurological:  Positive for dizziness. Negative for tremors, seizures, syncope, facial asymmetry, speech difficulty, weakness, numbness and headaches.   Hematological:  Negative for adenopathy.   Psychiatric/Behavioral:  Negative for behavioral problems, confusion,  hallucinations, self-injury, sleep disturbance and suicidal ideas. The patient is not nervous/anxious.      Objective   There were no vitals taken for this visit.    Physical Exam  Virtual Physical Exam  Awake alert and oriented x 3. Following directions and replying to all questions appropriately.  No use of accessory muscle of respiration. No acute respiratory distress.  Moving all extremities.  Clear bilateral conjunctiva.  No visible skin rash over the face and extremities.  Joints movements of UE and bilateral knee is normal.  Normal mood with appropriate effect and emotions.    Rest of the physical exam is limited due to virtual nature of this visit.    Assessment/Plan   Problem List Items Addressed This Visit          Symptoms and Signs    Balance disorder     [facility] appointment for PT as per patient choice.          Other Visit Diagnoses       Vertigo    -  Primary    Relevant Medications    meclizine (Antivert) 25 mg tablet

## 2024-07-09 DIAGNOSIS — R26.89 BALANCE PROBLEM: ICD-10-CM

## 2024-07-09 DIAGNOSIS — R42 VERTIGO: ICD-10-CM

## 2024-07-09 ASSESSMENT — ENCOUNTER SYMPTOMS
DIZZINESS: 1
SINUS PAIN: 0
MYALGIAS: 0
SORE THROAT: 0
HEMATURIA: 0
SLEEP DISTURBANCE: 0
ACTIVITY CHANGE: 0
SEIZURES: 0
NERVOUS/ANXIOUS: 0
NAUSEA: 0
CHEST TIGHTNESS: 0
WOUND: 0
ADENOPATHY: 0
COUGH: 0
JOINT SWELLING: 0
BLOOD IN STOOL: 0
WEAKNESS: 0
POLYPHAGIA: 0
POLYDIPSIA: 0
SPEECH DIFFICULTY: 0
UNEXPECTED WEIGHT CHANGE: 0
DIARRHEA: 0
CONFUSION: 0
EYE PAIN: 0
APPETITE CHANGE: 0
FATIGUE: 0
DYSURIA: 0
FACIAL ASYMMETRY: 0
BACK PAIN: 0
PALPITATIONS: 0
COLOR CHANGE: 0
SHORTNESS OF BREATH: 0
FLANK PAIN: 0
VOMITING: 0
PHOTOPHOBIA: 0
STRIDOR: 0
NUMBNESS: 0
FEVER: 0
HEADACHES: 0
CONSTIPATION: 0
ARTHRALGIAS: 1
WHEEZING: 0
NECK PAIN: 0
HALLUCINATIONS: 0
TREMORS: 0
TROUBLE SWALLOWING: 0
VOICE CHANGE: 0
ABDOMINAL PAIN: 0

## 2024-07-11 ENCOUNTER — TELEPHONE (OUTPATIENT)
Dept: PRIMARY CARE | Facility: CLINIC | Age: 74
End: 2024-07-11
Payer: MEDICARE

## 2024-07-11 NOTE — TELEPHONE ENCOUNTER
Pt called to request referral for balance therapy to be faxed over to [facility]. Pt has appt with them on 7/30/24.    Fax: [phone]

## 2024-07-25 DIAGNOSIS — E03.8 OTHER SPECIFIED HYPOTHYROIDISM: ICD-10-CM

## 2024-07-25 RX ORDER — SULFAMETHOXAZOLE AND TRIMETHOPRIM 800; 160 MG/1; MG/1
60 TABLET ORAL DAILY
Qty: 90 TABLET | Refills: 3 | Status: SHIPPED | OUTPATIENT
Start: 2024-07-25

## 2024-09-04 ENCOUNTER — APPOINTMENT (OUTPATIENT)
Dept: PRIMARY CARE | Facility: CLINIC | Age: 74
End: 2024-09-04
Payer: MEDICARE

## 2024-09-13 ENCOUNTER — LAB (OUTPATIENT)
Dept: LAB | Facility: LAB | Age: 74
End: 2024-09-13
Payer: MEDICARE

## 2024-09-13 DIAGNOSIS — E78.2 MIXED HYPERLIPIDEMIA: ICD-10-CM

## 2024-09-13 DIAGNOSIS — R73.03 PRE-DIABETES: ICD-10-CM

## 2024-09-13 LAB
CHOLEST SERPL-MCNC: 199 MG/DL (ref 0–199)
CHOLESTEROL/HDL RATIO: 3.9
EST. AVERAGE GLUCOSE BLD GHB EST-MCNC: 131 MG/DL
HBA1C MFR BLD: 6.2 %
HDLC SERPL-MCNC: 51.5 MG/DL
LDLC SERPL CALC-MCNC: 115 MG/DL
NON HDL CHOLESTEROL: 148 MG/DL (ref 0–149)
TRIGL SERPL-MCNC: 161 MG/DL (ref 0–149)
VLDL: 32 MG/DL (ref 0–40)

## 2024-09-13 PROCEDURE — 36415 COLL VENOUS BLD VENIPUNCTURE: CPT

## 2024-09-13 PROCEDURE — 80061 LIPID PANEL: CPT

## 2024-09-13 PROCEDURE — 83036 HEMOGLOBIN GLYCOSYLATED A1C: CPT

## 2024-09-18 ENCOUNTER — APPOINTMENT (OUTPATIENT)
Dept: PRIMARY CARE | Facility: CLINIC | Age: 74
End: 2024-09-18
Payer: MEDICARE

## 2024-09-18 VITALS
TEMPERATURE: 97.2 F | OXYGEN SATURATION: 99 % | BODY MASS INDEX: 39.86 KG/M2 | DIASTOLIC BLOOD PRESSURE: 86 MMHG | HEIGHT: 66 IN | HEART RATE: 75 BPM | SYSTOLIC BLOOD PRESSURE: 132 MMHG | WEIGHT: 248 LBS

## 2024-09-18 DIAGNOSIS — Z00.00 ROUTINE GENERAL MEDICAL EXAMINATION AT HEALTH CARE FACILITY: Primary | ICD-10-CM

## 2024-09-18 DIAGNOSIS — Z23 NEED FOR VACCINATION: ICD-10-CM

## 2024-09-18 PROCEDURE — 1158F ADVNC CARE PLAN TLK DOCD: CPT | Performed by: INTERNAL MEDICINE

## 2024-09-18 PROCEDURE — G0008 ADMIN INFLUENZA VIRUS VAC: HCPCS | Performed by: INTERNAL MEDICINE

## 2024-09-18 PROCEDURE — 1036F TOBACCO NON-USER: CPT | Performed by: INTERNAL MEDICINE

## 2024-09-18 PROCEDURE — 90662 IIV NO PRSV INCREASED AG IM: CPT | Performed by: INTERNAL MEDICINE

## 2024-09-18 PROCEDURE — 1159F MED LIST DOCD IN RCRD: CPT | Performed by: INTERNAL MEDICINE

## 2024-09-18 PROCEDURE — 1123F ACP DISCUSS/DSCN MKR DOCD: CPT | Performed by: INTERNAL MEDICINE

## 2024-09-18 PROCEDURE — 1160F RVW MEDS BY RX/DR IN RCRD: CPT | Performed by: INTERNAL MEDICINE

## 2024-09-18 PROCEDURE — G0439 PPPS, SUBSEQ VISIT: HCPCS | Performed by: INTERNAL MEDICINE

## 2024-09-18 PROCEDURE — 1170F FXNL STATUS ASSESSED: CPT | Performed by: INTERNAL MEDICINE

## 2024-09-18 ASSESSMENT — ACTIVITIES OF DAILY LIVING (ADL)
GROCERY_SHOPPING: INDEPENDENT
TAKING_MEDICATION: INDEPENDENT
TOILETING: INDEPENDENT
MANAGING_FINANCES: INDEPENDENT
NEEDS ASSISTANCE WITH FOOD: INDEPENDENT
EATING: INDEPENDENT
GROOMING: INDEPENDENT
BATHING: INDEPENDENT
STIL DRIVING: YES
USING TRANSPORTATION: INDEPENDENT
FEEDING YOURSELF: INDEPENDENT
JUDGMENT_ADEQUATE_SAFELY_COMPLETE_DAILY_ACTIVITIES: YES
USING TELEPHONE: INDEPENDENT
DRESSING: INDEPENDENT
PREPARING MEALS: INDEPENDENT
PATIENT'S MEMORY ADEQUATE TO SAFELY COMPLETE DAILY ACTIVITIES?: YES
DOING_HOUSEWORK: INDEPENDENT
ADEQUATE_TO_COMPLETE_ADL: YES

## 2024-09-18 ASSESSMENT — ENCOUNTER SYMPTOMS
NERVOUS/ANXIOUS: 0
NAUSEA: 0
SEIZURES: 0
POLYDIPSIA: 0
COUGH: 0
DIARRHEA: 0
SLEEP DISTURBANCE: 0
STRIDOR: 0
COLOR CHANGE: 0
DIZZINESS: 0
NUMBNESS: 0
PHOTOPHOBIA: 0
FREQUENCY: 0
ARTHRALGIAS: 0
HEADACHES: 0
TROUBLE SWALLOWING: 0
BACK PAIN: 0
ACTIVITY CHANGE: 0
CONSTIPATION: 0
WHEEZING: 0
SPEECH DIFFICULTY: 0
MYALGIAS: 0
EYE PAIN: 0
FATIGUE: 0
SORE THROAT: 0
WOUND: 0
PALPITATIONS: 0
DYSURIA: 0
TREMORS: 0
CHEST TIGHTNESS: 0
ADENOPATHY: 0
POLYPHAGIA: 0
FLANK PAIN: 0
ABDOMINAL PAIN: 0
HEMATURIA: 0
SHORTNESS OF BREATH: 0
SINUS PAIN: 0
UNEXPECTED WEIGHT CHANGE: 0
JOINT SWELLING: 0
FACIAL ASYMMETRY: 0
CONFUSION: 0
APPETITE CHANGE: 0
VOMITING: 0
FEVER: 0
WEAKNESS: 0
NECK PAIN: 0
HALLUCINATIONS: 0
VOICE CHANGE: 0
BLOOD IN STOOL: 0

## 2024-09-18 ASSESSMENT — PATIENT HEALTH QUESTIONNAIRE - PHQ9
SUM OF ALL RESPONSES TO PHQ9 QUESTIONS 1 AND 2: 0
1. LITTLE INTEREST OR PLEASURE IN DOING THINGS: NOT AT ALL
2. FEELING DOWN, DEPRESSED OR HOPELESS: NOT AT ALL

## 2024-09-18 ASSESSMENT — ANXIETY QUESTIONNAIRES
5. BEING SO RESTLESS THAT IT IS HARD TO SIT STILL: NOT AT ALL
3. WORRYING TOO MUCH ABOUT DIFFERENT THINGS: NOT AT ALL
4. TROUBLE RELAXING: NOT AT ALL
1. FEELING NERVOUS, ANXIOUS, OR ON EDGE: NOT AT ALL
2. NOT BEING ABLE TO STOP OR CONTROL WORRYING: NOT AT ALL
6. BECOMING EASILY ANNOYED OR IRRITABLE: NOT AT ALL
7. FEELING AFRAID AS IF SOMETHING AWFUL MIGHT HAPPEN: NOT AT ALL
GAD7 TOTAL SCORE: 0

## 2024-09-18 NOTE — PROGRESS NOTES
Subjective   Reason for Visit: Mckayla Barron is an 74 y.o. female here for a Medicare Wellness visit.     Past Medical, Surgical, and Family History reviewed and updated in chart.    Reviewed all medications by prescribing practitioner or clinical pharmacist (such as prescriptions, OTCs, herbal therapies and supplements) and documented in the medical record.    HPI  Patient presented to the office for medicare wellness visit.    Patient Care Team:  Robinson Lou MD as PCP - General (Internal Medicine)  Meghana Webster MD     Review of Systems   Constitutional:  Negative for activity change, appetite change, fatigue, fever and unexpected weight change.   HENT:  Negative for dental problem, ear discharge, hearing loss, nosebleeds, postnasal drip, sinus pain, sore throat, trouble swallowing and voice change.    Eyes:  Negative for photophobia, pain and visual disturbance.   Respiratory:  Negative for cough, chest tightness, shortness of breath, wheezing and stridor.    Cardiovascular:  Negative for chest pain, palpitations and leg swelling.   Gastrointestinal:  Negative for abdominal pain, blood in stool, constipation, diarrhea, nausea and vomiting.   Endocrine: Negative for polydipsia, polyphagia and polyuria.   Genitourinary:  Negative for decreased urine volume, dyspareunia, dysuria, flank pain, frequency, hematuria and urgency.   Musculoskeletal:  Negative for arthralgias, back pain, gait problem, joint swelling, myalgias and neck pain.   Skin:  Negative for color change, rash and wound.   Allergic/Immunologic: Negative for environmental allergies and food allergies.   Neurological:  Negative for dizziness, tremors, seizures, syncope, facial asymmetry, speech difficulty, weakness, numbness and headaches.   Hematological:  Negative for adenopathy.   Psychiatric/Behavioral:  Negative for behavioral problems, confusion, hallucinations, self-injury, sleep disturbance and suicidal ideas. The patient is not  "nervous/anxious.      Objective   Vitals:  /86   Pulse 75   Temp 36.2 °C (97.2 °F)   Ht 1.664 m (5' 5.5\")   Wt 112 kg (248 lb)   SpO2 99%   BMI 40.64 kg/m²       Physical Exam  Vitals and nursing note reviewed.   Constitutional:       General: She is not in acute distress.     Appearance: Normal appearance. She is obese. She is not ill-appearing or toxic-appearing.   HENT:      Head: Normocephalic and atraumatic.      Nose: Nose normal.   Eyes:      Extraocular Movements: Extraocular movements intact.      Conjunctiva/sclera: Conjunctivae normal.      Pupils: Pupils are equal, round, and reactive to light.   Cardiovascular:      Rate and Rhythm: Normal rate and regular rhythm.      Pulses: Normal pulses.      Heart sounds: Normal heart sounds. No murmur heard.  Pulmonary:      Effort: Pulmonary effort is normal. No respiratory distress.      Breath sounds: Normal breath sounds. No stridor. No wheezing, rhonchi or rales.   Abdominal:      General: Bowel sounds are normal.      Palpations: Abdomen is soft.      Tenderness: There is no abdominal tenderness. There is no right CVA tenderness or left CVA tenderness.   Musculoskeletal:         General: No swelling or deformity. Normal range of motion.      Cervical back: Normal range of motion.      Right lower leg: No edema.      Left lower leg: No edema.   Skin:     General: Skin is warm.      Coloration: Skin is not jaundiced.      Findings: No erythema or rash.   Neurological:      General: No focal deficit present.      Mental Status: She is alert and oriented to person, place, and time.      Cranial Nerves: No cranial nerve deficit.      Gait: Gait normal.   Psychiatric:         Mood and Affect: Mood normal.         Behavior: Behavior normal.         Thought Content: Thought content normal.         Judgment: Judgment normal.       Assessment & Plan  Routine general medical examination at health care facility    Orders:    1 Year Follow Up In Primary Care " - Wellness Exam; Future    Need for vaccination    Orders:    Flu vaccine, trivalent, preservative free, HIGH-DOSE, age 65y+ (Fluzone)         Addendum to note:   Patient is getting procedure for bilateral blepharoplasty of upper eyelids and left brow ptosis repair.  Patient is medically cleared for the surgical procedure which has been planned for 10/16/2024.

## 2024-09-19 DIAGNOSIS — E78.2 MIXED HYPERLIPIDEMIA: ICD-10-CM

## 2024-09-19 RX ORDER — EZETIMIBE 10 MG/1
10 TABLET ORAL DAILY
Qty: 90 TABLET | Refills: 1 | Status: SHIPPED | OUTPATIENT
Start: 2024-09-19

## 2024-09-30 PROBLEM — M54.50 LOW BACK PAIN, UNSPECIFIED: Status: ACTIVE | Noted: 2024-09-30

## 2024-10-01 ENCOUNTER — OFFICE VISIT (OUTPATIENT)
Dept: CARDIOLOGY | Facility: CLINIC | Age: 74
End: 2024-10-01
Payer: MEDICARE

## 2024-10-01 VITALS
DIASTOLIC BLOOD PRESSURE: 83 MMHG | SYSTOLIC BLOOD PRESSURE: 121 MMHG | BODY MASS INDEX: 41.53 KG/M2 | HEIGHT: 65 IN | OXYGEN SATURATION: 96 % | HEART RATE: 70 BPM | WEIGHT: 249.25 LBS

## 2024-10-01 DIAGNOSIS — I48.0 PAROXYSMAL ATRIAL FIBRILLATION (MULTI): Primary | ICD-10-CM

## 2024-10-01 PROCEDURE — 1036F TOBACCO NON-USER: CPT | Performed by: INTERNAL MEDICINE

## 2024-10-01 PROCEDURE — 99214 OFFICE O/P EST MOD 30 MIN: CPT | Performed by: INTERNAL MEDICINE

## 2024-10-01 PROCEDURE — 1160F RVW MEDS BY RX/DR IN RCRD: CPT | Performed by: INTERNAL MEDICINE

## 2024-10-01 PROCEDURE — 1125F AMNT PAIN NOTED PAIN PRSNT: CPT | Performed by: INTERNAL MEDICINE

## 2024-10-01 PROCEDURE — 1159F MED LIST DOCD IN RCRD: CPT | Performed by: INTERNAL MEDICINE

## 2024-10-01 RX ORDER — LORATADINE 10 MG/1
10 TABLET ORAL DAILY
COMMUNITY

## 2024-10-01 ASSESSMENT — PATIENT HEALTH QUESTIONNAIRE - PHQ9
2. FEELING DOWN, DEPRESSED OR HOPELESS: NOT AT ALL
SUM OF ALL RESPONSES TO PHQ9 QUESTIONS 1 AND 2: 0
1. LITTLE INTEREST OR PLEASURE IN DOING THINGS: NOT AT ALL

## 2024-10-01 ASSESSMENT — ENCOUNTER SYMPTOMS
DEPRESSION: 0
OCCASIONAL FEELINGS OF UNSTEADINESS: 0
LOSS OF SENSATION IN FEET: 1

## 2024-10-01 ASSESSMENT — PAIN SCALES - GENERAL: PAINLEVEL: 4

## 2024-10-01 NOTE — PROGRESS NOTES
Primary Care Physician: Robinson Lou MD  Date of Visit: 10/01/2024  9:00 AM EDT  [place] [facility] [place] [address]     Chief Complaint:   Chief Complaint   Patient presents with    Follow-up    Atrial Fibrillation    Hyperlipidemia        HPI / Summary:   Mckayla Barron is a 74 y.o. female presents for followup.   2011 A-fib PVI, BMI elevation, HTN, DL, depression, PTSD, anxiety, KESHA, lumbar radiculopathy, tremor monitor 2 to 3% A-fib.  Intolerant to statins, DM2  Vertigo,tinnitus, to see someone about it  One afib episode lasted 40 minutes.  2-3% on Apple watch  Feels weak.  Responded well to ezetimibe.    Specialty Problems          Cardiology Problems    History of cardiac radiofrequency ablation    Mixed hyperlipidemia    Paroxysmal atrial fibrillation (Multi)        Past Medical History:   Diagnosis Date    Abnormal weight gain     Weight gain    Depression, unspecified 01/05/2015    Depression    Disease of thyroid gland     Disorder of thyroid, unspecified     Thyroid trouble    Hypertension     Other specified hypothyroidism 01/05/2015    Secondary hypothyroidism    Personal history of other diseases of the circulatory system     History of cardiac disorder    Personal history of other diseases of the digestive system     History of dental problems    Personal history of other diseases of the nervous system and sense organs     History of tinnitus    Tremor, unspecified     Occasional tremors          Past Surgical History:   Procedure Laterality Date    ANKLE SURGERY  07/21/2014    Ankle Surgery    CHOLECYSTECTOMY  07/21/2014    Cholecystectomy    COLON SURGERY  09/28/2015    Colon Surgery    COLONOSCOPY W/ POLYPECTOMY  07/21/2014    Complete Colonoscopy For Polyp Removal    DILATION AND CURETTAGE OF UTERUS  07/21/2014    Dilation And Curettage    HERNIA REPAIR  07/21/2014    Inguinal Hernia Repair    HYSTERECTOMY  07/21/2014    Hysterectomy    KNEE SURGERY  07/21/2014    Knee Surgery    MOUTH SURGERY   07/21/2014    Oral Surgery Tooth Extraction    MR CHEST ANGIO W IV CONTRAST  3/14/2012    MR CHEST ANGIO W IV CONTRAST 3/14/2012 [facility]    OTHER SURGICAL HISTORY  07/21/2014    Ant Spinal Diskect Osteophytect Lumb Interspace Microdiscect          Social History:   reports that she has never smoked. She has never been exposed to tobacco smoke. She has never used smokeless tobacco. She reports that she does not drink alcohol and does not use drugs.      Allergies:  Allergies   Allergen Reactions    Hydroxyzine Pamoate Shortness of breath    Mushroom Flavor Anaphylaxis     Mushroom allergy    Shellfish Derived Anaphylaxis    Acetaminophen Other     CAUSES LIVER TENDERNESS AND RARLEY TAKES IT    Liver tenderness    Aspirin Other     Increases tinnitus    Meperidine (Pf) Hallucinations     RESPIRATORY DIFFICULTIES, HALLUCINATIONS    Nsaids (Non-Steroidal Anti-Inflammatory Drug) Other     LIVER TENDERNESS AND SWELLING    Liver tenderness and swelling    Nystatin Other    Primidone Dizziness and Headache     Dizziness, nausea, headache    Statins-Hmg-Coa Reductase Inhibitors Myalgia     CAUSED LIVER TENDERNESS AND MUSCLE PAIN    Muscle spams and cramping    Codeine Rash and Hives     Rash, hallucination    Latex Other and Rash     Skin blisters    Oxycodone-Acetaminophen Other, Hallucinations, Hives and Rash     HALLUCINATIONS    Rash, hallucination    Povidone-Iodine Rash     BLISTERS    Red Dye Hives and Rash       Outpatient Medications:  Current Outpatient Medications   Medication Instructions    acetaminophen (Tylenol) 500 mg tablet Take 1 to 2 tablets by mouth every 6 hours as needed for pain or fever. Do not exceed 6 tablets in 24 hours    albuterol (Ventolin HFA) 90 mcg/actuation inhaler 2 puffs, inhalation, Every 4 hours PRN    [place] Thyroid 60 mg, oral, Daily    BACILLUS COAGULANS-INULIN ORAL oral    buPROPion SR (Wellbutrin SR) 200 mg 12 hr tablet 1 tablet, oral, Daily    carisoprodol (Soma) 350  "mg tablet Every 6 hours    carvedilol (COREG) 6.25 mg, oral, 2 times daily    cetirizine (ZyrTEC) 10 mg tablet oral, Every 24 hours    clonazePAM (KLONOPIN) 0.25 mg, oral, Nightly    dicyclomine (Bentyl) 10 mg capsule 1 CAP ORALLY 3 TIMES A DAY AS NEEDED FOR 30 DAY(S)    diphenhydrAMINE (Benadryl Allergy) 25 mg tablet oral    DULoxetine (Cymbalta) 60 mg DR capsule 1 capsule, oral, Daily    ezetimibe (ZETIA) 10 mg, oral, Daily    famotidine (PEPCID) 20 mg, oral    fluticasone (Flonase Allergy Relief) 50 mcg/actuation nasal spray Every 24 hours    guaiFENesin (Mucinex) 600 mg 12 hr tablet Every 12 hours    ketorolac (TORADOL) 10 mg, oral, Every 8 hours PRN    levocetirizine (Xyzal) 5 mg tablet Every 24 hours    lidocaine 4 % patch Apply 1 patch to the affected area(s) every 8 hours    loratadine (CLARITIN) 10 mg, oral, Daily    medium chain triglycerides, MCT, oil (MCT Oil) 7.7 kcal/mL oil oral    melatonin 5 mg capsule Every 24 hours    naloxone (Narcan) 4 mg/0.1 mL nasal spray For Recipient Use. Use 1 Spray in one nostril. Seek immediate emergency medical help. If response is not obtained after 2 or 3 minutes, administer 2nd dose using new device in other nostril until emergency help arrives. Emergency use only    naproxen sodium (ALEVE) 220 mg, oral, Every 12 hours    nystatin (Mycostatin) 100,000 unit/gram powder APPLY TO THE AFFECTED AREA 3 TIMES DAILY.    risperiDONE (RISPERDAL) 0.25 mg, oral, 2 times daily    rivaroxaban (Xarelto) 20 mg tablet 1 tablet, oral, Daily    rivaroxaban (XARELTO) 20 mg, oral, Daily with evening meal, Take with food.    SUMAtriptan (IMITREX) 50 mg, oral, Once as needed, May repeat after 2 hours.    tiZANidine (ZANAFLEX) 2 mg, oral, Every 8 hours PRN       ROS     Physical Exam:  Vitals:    10/01/24 0856   BP: 121/83   BP Location: Left arm   Patient Position: Sitting   BP Cuff Size: Large adult   Pulse: 70   SpO2: 96%   Weight: 113 kg (249 lb 4 oz)   Height: 1.638 m (5' 4.5\")     Wt " "Readings from Last 5 Encounters:   10/01/24 113 kg (249 lb 4 oz)   09/18/24 112 kg (248 lb)   05/29/24 115 kg (253 lb 5 oz)   05/02/24 112 kg (247 lb)   08/29/22 108 kg (238 lb 6.4 oz)     Body mass index is 42.12 kg/m².     Well-developed female in no acute distress.  Flat JVP.  Normal carotid upstrokes.  Regular rhythm no gallop.  Clear lungs.  Soft abdomen.  No ankle edema  Last Labs:  CMP:  Recent Labs     05/03/24  0845 01/28/21 1946 11/09/20  1559 07/23/19  0905 06/21/18  1353    144 142 143 143   K 4.1 3.8 3.9 3.7 4.1    105 103 103 103   CO2 30 30 29 26 25   ANIONGAP 13 9 10 14 15   BUN 19 20 20 19 16   CREATININE 0.93 0.7 0.7 0.7 0.7   EGFR 64 88 88 88 88   GLUCOSE 148* 101* 110* 108* 101*     Recent Labs     05/03/24  0845 06/21/18  1353   ALBUMIN 4.3 4.0   ALKPHOS 72 83   ALT 15 19   AST 21 31   BILITOT 0.7 0.2     CBC:  Recent Labs     05/03/24  0845 01/28/21 1946 11/09/20  1559 07/23/19  0905 06/21/18  1353   WBC 5.0 5.1 6.2 4.8 4.8   HGB 15.0 13.7 14.3 13.4 13.5   HCT 46.6* 43.3 44.0 41.7 41.6    239 264 212 229   MCV 94 95.4 93.2 92.1 92.7     COAG:   Recent Labs     01/28/21  1946   INR 0.9     HEME/ENDO:  Recent Labs     09/13/24  0920 05/03/24  0845 01/28/21 1946 07/23/19  0905 06/21/18  1353   IRONSAT  --   --  30.1  --   --    TSH  --  1.85 1.18 2.43 1.16   HGBA1C 6.2* 6.4*  --   --   --       CARDIAC: No results for input(s): \"LDH\", \"CKMB\", \"TROPHS\", \"BNP\" in the last 22073 hours.    No lab exists for component: \"CK\", \"CKMBP\"  Recent Labs     09/13/24  0920 05/03/24  0845 07/23/19  0905   CHOL 199 282* 222*   HDL 51.5 40.1 42*   TRIG 161* 303* 328*       Last Cardiology Tests:  ECG:      Echo:  Echo Results:  No results found for this or any previous visit from the past 3650 days.       Cath:      Stress Test:  Stress Results:  No results found for this or any previous visit from the past 365 days.         Cardiac Imaging:  ECG 12 lead (Clinic Performed)  Normal sinus " rhythm  Normal ECG  When compared with ECG of 28-JUN-2022 08:47,  Nonspecific T wave abnormality, improved in Lateral leads  Confirmed by Jorden Chamorro ([id number]) on 6/3/2024 5:32:37 PM        Assessment/Plan   74-year-old female last seen in May with a history of streamer BMI elevation prior PVI in 2011, HTN, DL, depression, PTSD, anxiety, KESHA, radiculopathy in her legs doing better on ezetimibe, intolerance of statin, recently developed vertigo with tinnitus may suggest an inner ear origin she is going to be seeking an evaluation with another physician.  She also states her 's developed bilateral lower extremity swelling and she wants to know if I thought a cardiologist should be seen I told her I think that seems reasonable.  She is tolerating Xarelto.  Her episodes of A-fib are relatively infrequent so I do not think another intervention is warranted will continue current meds I will see her back in 6 months.  Can consider GLP-1 less so for diabetes more for weight loss.        Orders:  No orders of the defined types were placed in this encounter.     Followup Appts:  Future Appointments   Date Time Provider Department Center   10/22/2024 10:00 AM FEROZ Holman, CCC-A [id number] [place] [facility] [place]   10/22/2024 10:30 AM MD SALAS [id number] [facility] / [facility]   3/18/2025  1:00 PM Robinson Lou MD [id number]           ____________________________________________________________  Jorden Chamorro MD    Senior Attending Physician  [facility]  [facility]

## 2024-10-14 ENCOUNTER — APPOINTMENT (OUTPATIENT)
Dept: OBSTETRICS AND GYNECOLOGY | Facility: HOSPITAL | Age: 74
End: 2024-10-14
Payer: MEDICARE

## 2024-10-22 ENCOUNTER — APPOINTMENT (OUTPATIENT)
Dept: OTOLARYNGOLOGY | Facility: CLINIC | Age: 74
End: 2024-10-22
Payer: MEDICARE

## 2024-10-22 ENCOUNTER — APPOINTMENT (OUTPATIENT)
Dept: AUDIOLOGY | Facility: CLINIC | Age: 74
End: 2024-10-22
Payer: MEDICARE

## 2024-10-22 VITALS — BODY MASS INDEX: 41.48 KG/M2 | HEIGHT: 65 IN | TEMPERATURE: 97.8 F | WEIGHT: 249 LBS

## 2024-10-22 DIAGNOSIS — R42 DIZZINESS: ICD-10-CM

## 2024-10-22 DIAGNOSIS — H93.12 TINNITUS OF LEFT EAR: ICD-10-CM

## 2024-10-22 DIAGNOSIS — H90.3 BILATERAL SENSORINEURAL HEARING LOSS: ICD-10-CM

## 2024-10-22 DIAGNOSIS — R42 VERTIGO: Primary | ICD-10-CM

## 2024-10-22 DIAGNOSIS — R42 DIZZINESS AND GIDDINESS: ICD-10-CM

## 2024-10-22 DIAGNOSIS — H90.3 SENSORINEURAL HEARING LOSS, ASYMMETRICAL: Primary | ICD-10-CM

## 2024-10-22 PROCEDURE — 1036F TOBACCO NON-USER: CPT | Performed by: OTOLARYNGOLOGY

## 2024-10-22 PROCEDURE — 92557 COMPREHENSIVE HEARING TEST: CPT | Performed by: AUDIOLOGIST

## 2024-10-22 PROCEDURE — 92550 TYMPANOMETRY & REFLEX THRESH: CPT | Performed by: AUDIOLOGIST

## 2024-10-22 PROCEDURE — 1159F MED LIST DOCD IN RCRD: CPT | Performed by: OTOLARYNGOLOGY

## 2024-10-22 PROCEDURE — 99203 OFFICE O/P NEW LOW 30 MIN: CPT | Performed by: OTOLARYNGOLOGY

## 2024-10-22 NOTE — PROGRESS NOTES
Chief Complaint   Patient presents with    New Patient Visit     VERTIGO,F/U AUDIO     HPI:  Mckayla Barron is a 74 y.o. female whose been having some problems with dizziness.  Occurring after she fell down and hit her head and had a concussion about a year ago.  Dizziness is described as problems with walking where she will veer to the right and lose her balance.  To the point where she is using a cane.  Can happen several times during the day with symptoms lasting seconds to minutes.  She is also having some left-sided tinnitus and does not notice anything on the right.  Audiogram shows bilateral sloping moderate to severe sensorineural hearing loss.  Little bit worse on the left in the high tones.    PMH:  Past Medical History:   Diagnosis Date    Abnormal weight gain     Weight gain    Depression, unspecified 01/05/2015    Depression    Disease of thyroid gland     Disorder of thyroid, unspecified     Thyroid trouble    Hypertension     Other specified hypothyroidism 01/05/2015    Secondary hypothyroidism    Personal history of other diseases of the circulatory system     History of cardiac disorder    Personal history of other diseases of the digestive system     History of dental problems    Personal history of other diseases of the nervous system and sense organs     History of tinnitus    Tremor, unspecified     Occasional tremors     Past Surgical History:   Procedure Laterality Date    ANKLE SURGERY  07/21/2014    Ankle Surgery    CHOLECYSTECTOMY  07/21/2014    Cholecystectomy    COLON SURGERY  09/28/2015    Colon Surgery    COLONOSCOPY W/ POLYPECTOMY  07/21/2014    Complete Colonoscopy For Polyp Removal    DILATION AND CURETTAGE OF UTERUS  07/21/2014    Dilation And Curettage    HERNIA REPAIR  07/21/2014    Inguinal Hernia Repair    HYSTERECTOMY  07/21/2014    Hysterectomy    KNEE SURGERY  07/21/2014    Knee Surgery    MOUTH SURGERY  07/21/2014    Oral Surgery Tooth Extraction    MR CHEST ANGIO W IV  CONTRAST  3/14/2012    MR CHEST ANGIO W IV CONTRAST 3/14/2012 [facility] [place] ANCILLARY LEGACY    OTHER SURGICAL HISTORY  07/21/2014    Ant Spinal Diskect Osteophytect Lumb Interspace Microdiscect         Medications:     Current Outpatient Medications:     acetaminophen (Tylenol) 500 mg tablet, Take 1 to 2 tablets by mouth every 6 hours as needed for pain or fever. Do not exceed 6 tablets in 24 hours, Disp: , Rfl:     albuterol (Ventolin HFA) 90 mcg/actuation inhaler, Inhale 2 puffs every 4 hours if needed for wheezing or shortness of breath., Disp: 8 g, Rfl: 2    buPROPion SR (Wellbutrin SR) 200 mg 12 hr tablet, Take 1 tablet (200 mg) by mouth once daily., Disp: , Rfl:     carvedilol (Coreg) 6.25 mg tablet, Take 1 tablet (6.25 mg) by mouth 2 times a day., Disp: , Rfl:     cetirizine (ZyrTEC) 10 mg tablet, Take by mouth once every 24 hours., Disp: , Rfl:     clonazePAM (KlonoPIN) 0.5 mg tablet, Take 0.5 tablets (0.25 mg) by mouth once daily at bedtime., Disp: , Rfl:     dicyclomine (Bentyl) 10 mg capsule, 1 CAP ORALLY 3 TIMES A DAY AS NEEDED FOR 30 DAY(S), Disp: , Rfl:     DULoxetine (Cymbalta) 60 mg DR capsule, Take 1 capsule (60 mg) by mouth once daily., Disp: , Rfl:     ezetimibe (Zetia) 10 mg tablet, TAKE 1 TABLET BY MOUTH EVERY DAY, Disp: 90 tablet, Rfl: 1    famotidine (Pepcid) 20 mg tablet, Take 1 tablet (20 mg) by mouth., Disp: , Rfl:     ketorolac (Toradol) 10 mg tablet, Take 1 tablet (10 mg) by mouth every 8 hours if needed., Disp: , Rfl:     naloxone (Narcan) 4 mg/0.1 mL nasal spray, For Recipient Use. Use 1 Spray in one nostril. Seek immediate emergency medical help. If response is not obtained after 2 or 3 minutes, administer 2nd dose using new device in other nostril until emergency help arrives. Emergency use only, Disp: , Rfl:     rivaroxaban (Xarelto) 20 mg tablet, Take 1 tablet (20 mg) by mouth once daily in the evening. Take with meals. Take with food., Disp: , Rfl:     rivaroxaban (Xarelto) 20 mg tablet,  Take 1 tablet (20 mg) by mouth once daily., Disp: , Rfl:     SUMAtriptan (Imitrex) 50 mg tablet, Take 1 tablet (50 mg) by mouth 1 time if needed for migraine. May repeat after 2 hours., Disp: 27 tablet, Rfl: 3    thyroid, pork, (Worth Thyroid) 60 mg tablet, TAKE 1 TABLET BY MOUTH EVERY DAY, Disp: 90 tablet, Rfl: 3    BACILLUS COAGULANS-INULIN ORAL, Take by mouth. (Patient not taking: Reported on 10/22/2024), Disp: , Rfl:     carisoprodol (Soma) 350 mg tablet, every 6 hours. (Patient not taking: Reported on 10/22/2024), Disp: , Rfl:     diphenhydrAMINE (Benadryl Allergy) 25 mg tablet, Take by mouth. (Patient not taking: Reported on 10/22/2024), Disp: , Rfl:     fluticasone (Flonase Allergy Relief) 50 mcg/actuation nasal spray, once every 24 hours. (Patient not taking: Reported on 10/22/2024), Disp: , Rfl:     guaiFENesin (Mucinex) 600 mg 12 hr tablet, every 12 hours. (Patient not taking: Reported on 10/22/2024), Disp: , Rfl:     levocetirizine (Xyzal) 5 mg tablet, once every 24 hours. (Patient not taking: Reported on 10/22/2024), Disp: , Rfl:     lidocaine 4 % patch, Apply 1 patch to the affected area(s) every 8 hours (Patient not taking: Reported on 10/22/2024), Disp: , Rfl:     loratadine (Claritin) 10 mg tablet, Take 1 tablet (10 mg) by mouth once daily. (Patient not taking: Reported on 10/22/2024), Disp: , Rfl:     medium chain triglycerides, MCT, oil (MCT Oil) 7.7 kcal/mL oil, Take by mouth. (Patient not taking: Reported on 10/22/2024), Disp: , Rfl:     melatonin 5 mg capsule, once every 24 hours. (Patient not taking: Reported on 10/22/2024), Disp: , Rfl:     naproxen sodium (Aleve) 220 mg tablet, Take 1 tablet (220 mg) by mouth every 12 hours. (Patient not taking: Reported on 10/22/2024), Disp: , Rfl:     nystatin (Mycostatin) 100,000 unit/gram powder, APPLY TO THE AFFECTED AREA 3 TIMES DAILY. (Patient not taking: Reported on 10/22/2024), Disp: , Rfl:     risperiDONE (RisperDAL) 0.25 mg tablet, Take 1 tablet  "(0.25 mg) by mouth 2 times a day. (Patient not taking: Reported on 10/22/2024), Disp: , Rfl:     tiZANidine (Zanaflex) 2 mg tablet, Take 1 tablet (2 mg) by mouth every 8 hours if needed. (Patient not taking: Reported on 10/22/2024), Disp: , Rfl:      Allergies:  Allergies   Allergen Reactions    Hydroxyzine Pamoate Shortness of breath    Mushroom Flavor Anaphylaxis     Mushroom allergy    Shellfish Derived Anaphylaxis    Acetaminophen Other     CAUSES LIVER TENDERNESS AND RARLEY TAKES IT    Liver tenderness    Aspirin Other     Increases tinnitus    Darifenacin Other and Unknown     Fluid imbalance, unable to empty bladder, major constipation    Meperidine (Pf) Hallucinations     RESPIRATORY DIFFICULTIES, HALLUCINATIONS    Nsaids (Non-Steroidal Anti-Inflammatory Drug) Other     LIVER TENDERNESS AND SWELLING    Liver tenderness and swelling    Nystatin Other    Primidone Dizziness and Headache     Dizziness, nausea, headache    Statins-Hmg-Coa Reductase Inhibitors Myalgia     CAUSED LIVER TENDERNESS AND MUSCLE PAIN    Muscle spams and cramping    Codeine Rash and Hives     Rash, hallucination    Latex Other and Rash     Skin blisters    Oxycodone-Acetaminophen Other, Hallucinations, Hives and Rash     HALLUCINATIONS    Rash, hallucination    Povidone-Iodine Rash     BLISTERS    Red Dye Hives and Rash        ROS:  Review of systems normal unless stated otherwise in the HPI and/or PMH.    Physical Exam:  Temperature 36.6 °C (97.8 °F), height 1.638 m (5' 4.5\"), weight 113 kg (249 lb). Body mass index is 42.08 kg/m².     GENERAL APPEARANCE: Well developed and well nourished.  Alert and oriented in no acute distress.  Normal vocal quality.      HEAD/FACE: No erythema or edema or facial tenderness.  Normal facial nerve function bilaterally.    EAR:       EXTERNAL: Normal pinnas and external auditory canals without lesion or obstructing wax.       MIDDLE EAR: Tympanic membranes intact and mobile with normal landmarks.  " Middle ear space appears well aerated.       TUBE STATUS: N/A       MASTOID CAVITY: N/A       HEARING: Gross hearing assessment is within normal limits.      NOSE:       VISUALIZED USING: Anterior rhinoscopy with headlight and nasal speculum.       DORSUM: Midline, nontraumatic appearance.       MUCOSA: Normal-appearing.       SECRETIONS: Normal.       SEPTUM: Midline and nonobstructing.       INFERIOR TURBINATES: Normal.       MIDDLE TURBINATES/MEATUS: N/A       BLEEDING: N/A         ORAL CAVITY/PHARYNX:       TEETH: Adequate dentition.       TONGUE: No mass or lesion.  Normal mobility.       FLOOR OF MOUTH: No mass or lesion.       PALATE: Normal hard palate, soft palate, and uvula.       OROPHARYNX: Normal without mass or lesion.       BUCCAL MUCOSA/GBS: Normal without mass or lesion.       LIPS: Normal.    LARYNX/HYPOPHARYNX/NASOPHARYNX: N/A    NECK: No palpable masses or abnormal adenopathy.  Trachea is midline.    THYROID: No thyromegaly or palpable nodule.    SALIVARY GLANDS: Normal bilateral parotid and submandibular glands by inspection and palpation.    TMJ's: Normal.    NEURO: Cranial nerve exam grossly normal bilaterally.       Assessment/Plan   Mckayla was seen today for new patient visit.  Diagnoses and all orders for this visit:  Vertigo (Primary)  -     MR IAC w and wo IV contrast; Future  -     Referral to Physical Therapy; Future  Dizziness and giddiness  -     MR IAC w and wo IV contrast; Future  -     Referral to Physical Therapy; Future  Tinnitus of left ear  -     MR IAC w and wo IV contrast; Future  -     Referral to Physical Therapy; Future  Bilateral sensorineural hearing loss     Having some dizziness and some asymmetric left-sided tinnitus.  After some trauma.  She does have some lumbar spine problems as well.  Her dizziness and imbalance may be multifactorial.  Recommend we do balance therapy as well as check an MRI of the brain and IACs secondary to the dizziness and the asymmetric  tinnitus.  Follow up in about 3 months (around 1/22/2025) for test results after testing is complete.     Michel Weber MD

## 2024-10-22 NOTE — PROGRESS NOTES
AUDIOLOGY  AUDIOMETRIC EVALUATION    Name:  Mckayla Barron  :  1950  Age:  74 y.o.  Date of Evaluation:  2024    Reason for visit: Ms. Barron is seen in the clinic today at the request of Michel Weber MD in otolaryngology for an audiologic evaluation. Patient complains of dizziness, tinnitus in her left ear, and difficulty hearing. Audiologic evaluation completed at [facility] on 2023 revealed bilateral hearing loss; however, audiogram is unavailable for comparison.     EVALUATION  See scanned audiogram: “Media” > “Audiology Report” > Document ID [id number].      RESULTS  Otoscopic Evaluation  Right Ear: clear ear canal with visualization of the tympanic membrane  Left Ear: clear ear canal with visualization of the tympanic membrane    Immittance Measures  Tympanometry:  Right Ear: Type A, normal tympanic membrane mobility with normal middle ear pressure  Left Ear: Type A, normal tympanic membrane mobility with normal middle ear pressure    Acoustic Reflexes:  Ipsilateral Right Ear: present and normal from 500 Hz to 4000 Hz  Ipsilateral Left Ear: present and normal from 500 Hz to 2000 Hz, absent at 4000 Hz  Contralateral Right Ear: did not evaluate  Contralateral Left Ear: did not evaluate    Distortion Product Otoacoustic Emissions (DPOAEs)  Right Ear: absent from 1000 Hz to 8000 Hz  Left Ear: absent from 1000 Hz to 8000 Hz    Audiometry  Test Technique and Reliability:   Standard audiometry via supra-aural headphones. Pulsed tone stimulus. Reliability is good.    Pure tone air and bone conduction audiometry:  Right Ear: normal hearing sensitivity through 2000 Hz with a mild to moderately-severe sensorineural hearing loss in the higher frequencies   Left Ear: normal hearing sensitivity through 1000 Hz with a mild to severe sensorineural hearing loss in the higher frequencies     Speech Audiometry:  Speech Reception Threshold (SRT) Right Ear: 20 dB HL  Speech Reception  Threshold (SRT) Left Ear: 20 dB HL  Word Recognition Score (WRS) Right Ear: Excellent, 100% at 60 dB HL  Word Recognition Score (WRS) Left Ear: Excellent, 100% at 60 dB HL     IMPRESSIONS  Audiometric evaluation revealed slightly asymmetric, left greater than right high frequency sensorineural hearing loss. Tympanometry indicates normal tympanic membrane mobility with normal middle ear pressure bilaterally. The presence of acoustic reflexes within normal intensity limits is consistent with normal middle ear and brainstem function, and suggests that auditory sensitivity is not significantly impaired. The absence of acoustic reflexes is consistent with a middle ear disorder, hearing loss in the stimulated ear, and/or interruption of neural innervation of the stapedius muscle. Absent Distortion Product Otoacoustic Emissions (DPOAEs) are consistent with abnormal cochlear outer hair cell function and some degree of hearing loss at those frequencies.    RECOMMENDATIONS  - Follow up with otolaryngology today as scheduled.  - Annual audiologic evaluation, sooner if an acute change is noted.  - Consider hearing aids. Contact insurance to determine if there is an applicable benefit and where it can be used. Schedule a hearing aid evaluation appointment should she wish to proceed with hearing aids through our clinic.  - Follow-up with medical care team as planned.    PATIENT EDUCATION  Discussed results, impressions and recommendations with the patient. Questions were addressed and the patient was encouraged to contact our office should concerns arise.    Time for this encounter: 4030-1371    Lazaro Tucker, CCC-A  Licensed Audiologist

## 2024-10-29 ENCOUNTER — HOSPITAL ENCOUNTER (OUTPATIENT)
Dept: RADIOLOGY | Facility: HOSPITAL | Age: 74
Discharge: HOME | End: 2024-10-29
Payer: MEDICARE

## 2024-10-29 DIAGNOSIS — R42 DIZZINESS AND GIDDINESS: ICD-10-CM

## 2024-10-29 DIAGNOSIS — R42 VERTIGO: ICD-10-CM

## 2024-10-29 DIAGNOSIS — H93.12 TINNITUS OF LEFT EAR: ICD-10-CM

## 2024-10-29 PROCEDURE — 70553 MRI BRAIN STEM W/O & W/DYE: CPT

## 2024-10-29 PROCEDURE — A9575 INJ GADOTERATE MEGLUMI 0.1ML: HCPCS | Performed by: OTOLARYNGOLOGY

## 2024-10-29 PROCEDURE — 70553 MRI BRAIN STEM W/O & W/DYE: CPT | Performed by: RADIOLOGY

## 2024-10-29 PROCEDURE — 2550000001 HC RX 255 CONTRASTS: Performed by: OTOLARYNGOLOGY

## 2024-10-29 RX ORDER — GADOTERATE MEGLUMINE 376.9 MG/ML
15 INJECTION INTRAVENOUS
Status: COMPLETED | OUTPATIENT
Start: 2024-10-29 | End: 2024-10-29

## 2024-11-11 DIAGNOSIS — Z12.31 BREAST CANCER SCREENING BY MAMMOGRAM: Primary | ICD-10-CM

## 2024-11-11 DIAGNOSIS — Z78.0 ASYMPTOMATIC MENOPAUSAL STATE: ICD-10-CM

## 2024-11-12 ENCOUNTER — CLINICAL SUPPORT (OUTPATIENT)
Dept: PHYSICAL THERAPY | Facility: CLINIC | Age: 74
End: 2024-11-12
Payer: MEDICARE

## 2024-11-12 DIAGNOSIS — R42 VERTIGO: ICD-10-CM

## 2024-11-12 DIAGNOSIS — R42 DIZZINESS AND GIDDINESS: ICD-10-CM

## 2024-11-12 DIAGNOSIS — H93.12 TINNITUS OF LEFT EAR: ICD-10-CM

## 2024-11-12 PROCEDURE — 97162 PT EVAL MOD COMPLEX 30 MIN: CPT | Mod: GP | Performed by: PHYSICAL THERAPIST

## 2024-11-12 PROCEDURE — 97112 NEUROMUSCULAR REEDUCATION: CPT | Mod: GP | Performed by: PHYSICAL THERAPIST

## 2024-11-12 SDOH — ECONOMIC STABILITY: GENERAL: QUALITY OF LIFE: GOOD

## 2024-11-12 ASSESSMENT — ENCOUNTER SYMPTOMS
EXACERBATED BY: MOVEMENT
PAIN SCALE: 0
PAIN SCALE AT LOWEST: 0
EXACERBATED BY: OVERHEAD ACTIVITY
PAIN SCALE AT HIGHEST: 0

## 2024-11-12 ASSESSMENT — PAIN - FUNCTIONAL ASSESSMENT: PAIN_FUNCTIONAL_ASSESSMENT: 0-10

## 2024-11-12 ASSESSMENT — PAIN SCALES - GENERAL: PAINLEVEL_OUTOF10: 0 - NO PAIN

## 2024-11-12 NOTE — PROGRESS NOTES
"Physical Therapy Evaluation and Treatment     Patient Name: Mckayla Barron  MRN: [id number]  Encounter date: 2024  Time Calculation  Start Time: 1515  Stop Time: 1603  Time Calculation (min): 48 min  PT Evaluation Time Entry  PT Evaluation (Moderate) Time Entry: 20  PT Therapeutic Procedures Time Entry  Neuromuscular Re-Education Time Entry: 25    Visit # 1 of 10+  Visits/Dates Authorized: [place]    Current Problem:   Problem List Items Addressed This Visit    None  Visit Diagnoses         Codes    Vertigo     R42    Relevant Orders    Follow Up In Physical Therapy    Dizziness and giddiness     R42    Relevant Orders    Follow Up In Physical Therapy    Tinnitus of left ear     H93.12    Relevant Orders    Follow Up In Physical Therapy          Precautions:   Falls, dizziness, neuropathy BLE       Subjective Evaluation    History of Present Illness  Mechanism of injury: Pt is a 73 y/o female c/o dizziness and says it is not typical vertigo. Last fall was 2024. Ever since the fall in  her tinnitus is much worse. States the dizziness comes in a wave and she feels \"like she is going to go down.\" Ambulates with \"hurry-cane.\" Dizziness is described as problems with walking where she will veer to the right and lose her balance.  To the point where she is using a cane.  Can happen several times during the day with symptoms lasting seconds to minutes.  She is also having some left-sided tinnitus and does not notice anything on the right.  Audiogram shows bilateral sloping moderate to severe sensorineural hearing loss.  Little bit worse on the left in the high tones. Changes of direction bring on the \"wave of dizziness.\"    Quality of life: good    Pain  Current pain ratin  At best pain ratin  At worst pain ratin  Location: Left ear tinnitus, ocassional sinus headaches  Aggravating factors: movement and overhead activity (increases dizziness)  Progression: no change (no change in dizziness since " seeing ENT)    Hand dominance: right    Patient Goals  Patient goals for therapy: increased strength, independence with ADLs/IADLs, return to work, increased motion and improved balance         Pain Assessment: 0-10  0-10 (Numeric) Pain Score: 0 - No pain  Objective     Strength/Myotome Testing     Left Hip   Planes of Motion   Flexion: 4+  Extension: 4+  Abduction: 4+  Adduction: 4+    Right Hip   Planes of Motion   Flexion: 4+  Extension: 4+  Abduction: 4+  Adduction: 4+    Left Knee   Flexion: 4+  Extension: 4+    Right Knee   Flexion: 4+  Extension: 4+    Left Ankle/Foot   Dorsiflexion: 5  Plantar flexion: 5    Right Ankle/Foot   Dorsiflexion: 5  Plantar flexion: 5       Vitals:   WFL    Objective     Strength/Myotome Testing     Left Hip   Planes of Motion   Flexion: 4+  Extension: 4+  Abduction: 4+  Adduction: 4+    Right Hip   Planes of Motion   Flexion: 4+  Extension: 4+  Abduction: 4+  Adduction: 4+    Left Knee   Flexion: 4+  Extension: 4+    Right Knee   Flexion: 4+  Extension: 4+    Left Ankle/Foot   Dorsiflexion: 5  Plantar flexion: 5    Right Ankle/Foot   Dorsiflexion: 5  Plantar flexion: 5          Romberg: Firm Eyes Open 30 sec, Eyes Closed 30 sec , Foam Eyes Open 30 sec, Eyes Closed 14 sec   Tandem: Firm Eyes Open 23R/27L sec, Foam Eyes Open 22L/26R sec  Single leg: Firm Eyes Open 3R/2L sec     Slight dizziness with EC on foam    Treatments:     Therapeutic Exercise 53963:   STS  Bridges  Standing SLR's  HR/TR    Neuromuscular Re-Ed 99278:   Tandem and SLS - firm and foam  RB  Foam alt toe taps  GAIT with head turns and nods- nods made her LOB and very dizzy  Gait with directions changes 180/360 degree turns- L turns LOB and dizzy  VOR/oculomotors- seated and standing  Head turns/nods- seated and standing      Therapeutic Activity 65954:    Gait Training 29381:     Manual Therapy 84676:     Modalities:        HEP / Access Codes:   HEP2GO copies in chart and vestibular exercises    Assessment/Plan    Moderate complexity due to patient's clinical presentation being evolving with changing characteristics, with comorbidities/complexities to include A-fib, asthma, neuropathy, headaches, thyroid disorder, obesity, limited cervical ROM, weakness, dizziness, falls, imbalance, all of which may negatively impact rehab tolerance and progression.     Post-Treatment Symptoms:  Response to Interventions: 0/10    Goals:   Active       PT Problem       dizziness       Start:  11/12/24    Expected End:  02/12/25       1. Patient to ambulate independently with their cane/walker with normal nikolai and symmetrical gait pattern   to reach their car in the driveway (400 feet) at their home safely.    2. Patient to ambulate independently with cane/walker within their home (200 feet) so patient will be able to   complete basic ADL's and household chores independently.    3. Patient to safely get on/off chair demonstrating proper transfer techniques independently to improve safe   functional mobility.     4. Patient will improve Dynamic Gait Index to 21/24 to reduce fall risk at home.    5. Patient will improve Activities Specific Balance Scale to <30% impairment to allow patient to walk in   crowded rooms without fear of falling.    6. Patient will improve Timed Up and Go Score to <12 seconds to allow patient to walk household distances   safely and reduce fall risk at home.     7. Patient will improve 30 second sit to stand test to >9 reps to allow patient the ability to transfer to/from   a chair safely indepedently.    8. Patient will demonstrate improved backward extension reactions in sitting to be able to effectively protect from   a fall eight of ten trials.     9. Patient to exhibit controlled lowering to  objects from the floor without compensations with distant   supervision five out of ten trials.    10. Patient will sit on the floor independently without support of hands so as not to fall over when reaching  for  objects.    After 8 weeks of PT....    Pt will be able to drive for 30 minutes without exacerbation of symptoms.    Pt will be able to complete 30 minutes of grocery shopping without exacerbation of symptoms.    Pt will be able to read for 30 minutes without exacerbation of symptoms.    Pt will be able to complete single leg balance for 5 seconds on L/R LE to allow pt to don pants safely.    Pt will be able to stand with eyes closed for 30 seconds to allow pt to wash hair in shower safely.     Pt will improve Dizziness Handicap Index to > 80% function to allow pt to walk in crowded room without symptom exacerbation.

## 2024-11-15 ENCOUNTER — TREATMENT (OUTPATIENT)
Dept: PHYSICAL THERAPY | Facility: CLINIC | Age: 74
End: 2024-11-15
Payer: MEDICARE

## 2024-11-15 DIAGNOSIS — R42 VERTIGO: ICD-10-CM

## 2024-11-15 DIAGNOSIS — H93.12 TINNITUS OF LEFT EAR: ICD-10-CM

## 2024-11-15 DIAGNOSIS — R42 DIZZINESS AND GIDDINESS: ICD-10-CM

## 2024-11-15 PROCEDURE — 97112 NEUROMUSCULAR REEDUCATION: CPT | Mod: GP

## 2024-11-15 ASSESSMENT — PAIN SCALES - GENERAL: PAINLEVEL_OUTOF10: 9

## 2024-11-15 ASSESSMENT — PAIN - FUNCTIONAL ASSESSMENT: PAIN_FUNCTIONAL_ASSESSMENT: 0-10

## 2024-11-15 NOTE — PROGRESS NOTES
Physical Therapy Treatment    Patient Name: Mckayla Barron  MRN: [id number]  Encounter date: 11/15/2024    Time Calculation  Start Time: 1108  Stop Time: 1148  Time Calculation (min): 40 min  PT Therapeutic Procedures Time Entry  Neuromuscular Re-Education Time Entry: 34  Therapeutic Exercise Time Entry: 4    Visit # 2 of 10+  Visits/Dates Authorized: [place]    Current Problem:   Problem List Items Addressed This Visit    None  Visit Diagnoses         Codes    Vertigo     R42    Dizziness and giddiness     R42    Tinnitus of left ear     H93.12            Precautions:       Falls, dizziness, neuropathy BLE    Subjective   General:    Pt states her neck is sore after doing the home exercises. Pt states her vestibular home exercises make her dizzy. Turning her body to the left can make her dizzy. Turning her head to the left.     Pre-Treatment Symptoms:   Pain Assessment: 0-10  0-10 (Numeric) Pain Score: 90    Objective   Findings:    Dizziness with head turns L/R, up/down  DLS foam EC: 15 seconds          Treatments:      Therapeutic Exercise 63890:   UE bike fwd/rev x 4 min    Neuromuscular Re-Ed 91040:   Walking around objects w/ left turn x6  Seated horizontal smooth pursuits L/R, up/down  Seated VOR  Standing balance on foam with head turns L/R, up/down  Tandem stance L/R, R/L on blue pads w/ arm raises for perturbations x 10 each  DLS on foam EC  Resisted gait rev/lateral 7.5# x4 each  Hurdles fwd/lateral 6 inch x 4 each L/R    Educated pt on ways to progress/modify vestibular exercises at home.     Therapeutic Activity 19085:      Gait Training 74098:       Manual Therapy 07393:       Modalities:       HEP / Access Codes:     Assessment:    Pt completed vestibular and balance exercises. Pt easily gets dizziness with head turns L/R or up/down. Pt struggled with static balance on foam with eyes closed as well. Pt educated on how to modify vestibular exercises at home to reduce dizziness.     Post-Treatment Symptoms:     0    Plan:        Goals:   Active       PT Problem       dizziness       Start:  11/12/24    Expected End:  02/12/25       1. Patient to ambulate independently with their cane/walker with normal nikolai and symmetrical gait pattern   to reach their car in the driveway (400 feet) at their home safely.    2. Patient to ambulate independently with cane/walker within their home (200 feet) so patient will be able to   complete basic ADL's and household chores independently.    3. Patient to safely get on/off chair demonstrating proper transfer techniques independently to improve safe   functional mobility.     4. Patient will improve Dynamic Gait Index to 21/24 to reduce fall risk at home.    5. Patient will improve Activities Specific Balance Scale to <30% impairment to allow patient to walk in   crowded rooms without fear of falling.    6. Patient will improve Timed Up and Go Score to <12 seconds to allow patient to walk household distances   safely and reduce fall risk at home.     7. Patient will improve 30 second sit to stand test to >9 reps to allow patient the ability to transfer to/from   a chair safely indepedently.    8. Patient will demonstrate improved backward extension reactions in sitting to be able to effectively protect from   a fall eight of ten trials.     9. Patient to exhibit controlled lowering to  objects from the floor without compensations with distant   supervision five out of ten trials.    10. Patient will sit on the floor independently without support of hands so as not to fall over when reaching  for objects.    After 8 weeks of PT....    Pt will be able to drive for 30 minutes without exacerbation of symptoms.    Pt will be able to complete 30 minutes of grocery shopping without exacerbation of symptoms.    Pt will be able to read for 30 minutes without exacerbation of symptoms.    Pt will be able to complete single leg balance for 5 seconds on L/R LE to allow pt to don pants  safely.    Pt will be able to stand with eyes closed for 30 seconds to allow pt to wash hair in shower safely.     Pt will improve Dizziness Handicap Index to > 80% function to allow pt to walk in crowded room without symptom exacerbation.

## 2024-11-26 ENCOUNTER — LAB (OUTPATIENT)
Dept: LAB | Facility: LAB | Age: 74
End: 2024-11-26
Payer: MEDICARE

## 2024-11-26 DIAGNOSIS — D48.7 NEOPLASM OF UNCERTAIN BEHAVIOR OF OTHER SPECIFIED SITES: Primary | ICD-10-CM

## 2024-11-26 LAB
BUN SERPL-MCNC: 16 MG/DL (ref 6–23)
CREAT SERPL-MCNC: 0.68 MG/DL (ref 0.5–1.05)
EGFRCR SERPLBLD CKD-EPI 2021: >90 ML/MIN/1.73M*2

## 2024-11-26 PROCEDURE — 82565 ASSAY OF CREATININE: CPT

## 2024-11-26 PROCEDURE — 36415 COLL VENOUS BLD VENIPUNCTURE: CPT

## 2024-11-26 PROCEDURE — 84520 ASSAY OF UREA NITROGEN: CPT

## 2024-12-10 ENCOUNTER — TREATMENT (OUTPATIENT)
Dept: PHYSICAL THERAPY | Facility: CLINIC | Age: 74
End: 2024-12-10
Payer: MEDICARE

## 2024-12-10 DIAGNOSIS — H93.12 TINNITUS OF LEFT EAR: ICD-10-CM

## 2024-12-10 DIAGNOSIS — R42 VERTIGO: ICD-10-CM

## 2024-12-10 DIAGNOSIS — R42 DIZZINESS AND GIDDINESS: ICD-10-CM

## 2024-12-10 PROCEDURE — 97112 NEUROMUSCULAR REEDUCATION: CPT | Mod: GP | Performed by: PHYSICAL THERAPIST

## 2024-12-10 ASSESSMENT — PAIN SCALES - GENERAL: PAINLEVEL_OUTOF10: 1

## 2024-12-10 ASSESSMENT — PAIN - FUNCTIONAL ASSESSMENT: PAIN_FUNCTIONAL_ASSESSMENT: 0-10

## 2024-12-10 NOTE — PROGRESS NOTES
Physical Therapy Treatment    Patient Name: Mckayla Barron  MRN: [id number]  Encounter date: 12/10/2024 PT Received On: 12/10/24  Time Calculation  Start Time: 1300  Stop Time: 1345  Time Calculation (min): 45 min  PT Therapeutic Procedures Time Entry  Neuromuscular Re-Education Time Entry: 40  Therapeutic Exercise Time Entry: 4    Visit # 3 of 10+  Visits/Dates Authorized: [place]    Current Problem:   Problem List Items Addressed This Visit    None  Visit Diagnoses         Codes    Vertigo     R42    Dizziness and giddiness     R42    Tinnitus of left ear     H93.12            Precautions:       Falls, dizziness, neuropathy BLE, tinnitus      Subjective   General:    Pt states she has not had dizziness since last PT session, no neck pain. The vestibular exercises are no longer making her dizzy.   No LOB.     Pre-Treatment Symptoms:   Pain Assessment: 0-10  0-10 (Numeric) Pain Score: 1    Objective   Findings:    Dizziness with head turns L/R, up/down  DLS foam EC: 15 seconds      Treatments:      Therapeutic Exercise 19507:   UE bike fwd/rev x 4 min    Neuromuscular Re-Ed 55549:   Figure 8's between cones  Walking around objects w/ left turn x6  Seated horizontal smooth pursuits L/R, up/down  Standing VOR  Standing balance on foam with head turns L/R, up/down  Tandem stance L/R, R/L on blue pads w/ arm raises for perturbations x 10 each  DLS on foam EC  Resisted gait rev/lateral 7.5# x4 each  Hurdles fwd/lateral 6 inch x 4 each L/R  Intraoral release- sheet for home   Educated pt on ways to progress/modify vestibular exercises at home.     Therapeutic Activity 71622: tommy      Gait Training 75886: tommy      Manual Therapy 06440: dnp      Modalities: dnp      HEP / Access Codes:   Copies in chart    Assessment:    Pt completed vestibular and balance exercises without any LOB or  dizziness. Pt was slightly dizzy nodding up/down while walking. Pt educated on how to perform intraoral release at home.     Post-Treatment  Symptoms:   Response to Interventions: No change in pain    Plan:    Cont to progress vestibular and balance exercises, incorporate LE strength as well. Added intra oral for tinnitus    Goals:   Active       PT Problem       dizziness       Start:  11/12/24    Expected End:  02/12/25       1. Patient to ambulate independently with their cane/walker with normal nikolai and symmetrical gait pattern   to reach their car in the driveway (400 feet) at their home safely.    2. Patient to ambulate independently with cane/walker within their home (200 feet) so patient will be able to   complete basic ADL's and household chores independently.    3. Patient to safely get on/off chair demonstrating proper transfer techniques independently to improve safe   functional mobility.     4. Patient will improve Dynamic Gait Index to 21/24 to reduce fall risk at home.    5. Patient will improve Activities Specific Balance Scale to <30% impairment to allow patient to walk in   crowded rooms without fear of falling.    6. Patient will improve Timed Up and Go Score to <12 seconds to allow patient to walk household distances   safely and reduce fall risk at home.     7. Patient will improve 30 second sit to stand test to >9 reps to allow patient the ability to transfer to/from   a chair safely indepedently.    8. Patient will demonstrate improved backward extension reactions in sitting to be able to effectively protect from   a fall eight of ten trials.     9. Patient to exhibit controlled lowering to  objects from the floor without compensations with distant   supervision five out of ten trials.    10. Patient will sit on the floor independently without support of hands so as not to fall over when reaching  for objects.    After 8 weeks of PT....    Pt will be able to drive for 30 minutes without exacerbation of symptoms.    Pt will be able to complete 30 minutes of grocery shopping without exacerbation of symptoms.    Pt will be  able to read for 30 minutes without exacerbation of symptoms.    Pt will be able to complete single leg balance for 5 seconds on L/R LE to allow pt to don pants safely.    Pt will be able to stand with eyes closed for 30 seconds to allow pt to wash hair in shower safely.     Pt will improve Dizziness Handicap Index to > 80% function to allow pt to walk in crowded room without symptom exacerbation.

## 2024-12-12 ENCOUNTER — TREATMENT (OUTPATIENT)
Dept: PHYSICAL THERAPY | Facility: CLINIC | Age: 74
End: 2024-12-12
Payer: MEDICARE

## 2024-12-12 DIAGNOSIS — H93.12 TINNITUS OF LEFT EAR: ICD-10-CM

## 2024-12-12 DIAGNOSIS — R42 VERTIGO: ICD-10-CM

## 2024-12-12 DIAGNOSIS — R42 DIZZINESS AND GIDDINESS: ICD-10-CM

## 2024-12-12 PROCEDURE — 97110 THERAPEUTIC EXERCISES: CPT | Mod: GP | Performed by: PHYSICAL THERAPIST

## 2024-12-12 PROCEDURE — 97112 NEUROMUSCULAR REEDUCATION: CPT | Mod: GP | Performed by: PHYSICAL THERAPIST

## 2024-12-12 NOTE — PROGRESS NOTES
Physical Therapy Treatment    Patient Name: Mckayla Barron  MRN: [id number]  Encounter date: 12/12/2024    Time Calculation  Start Time: 0300  Stop Time: 0337  Time Calculation (min): 37 min  PT Therapeutic Procedures Time Entry  Neuromuscular Re-Education Time Entry: 17  Therapeutic Exercise Time Entry: 20    Visit # 4 of 10+  Visits/Dates Authorized: [place]    Current Problem:   Problem List Items Addressed This Visit    None  Visit Diagnoses         Codes    Vertigo     R42    Dizziness and giddiness     R42    Tinnitus of left ear     H93.12              Precautions:       Falls, dizziness, neuropathy BLE, tinnitus      Subjective   General:    Pt reports that her dizziness has been good for the last 6 days or so. She has not even been using her cane, however she notices she has had increased tinnitus over the past few days - she guesses that it might be due to the cold and windy weather.      Pre-Treatment Symptoms:        Objective   Findings:    No dizziness with any activitiy  DLS foam EC: 60      Treatments:      Therapeutic Exercise 04670:   UE bike fwd/rev x 6 min    Neuromuscular Re-Ed 17717:   Figure 8's between cones DNP  Resisted gait forward and back 12.5# x4 each  Hurdles fwd/lateral 6 inch x 4 each L/R DNP  RB rock with and without UE support and then balance x 1 min  Walking around objects w/ left turn x6 DNP  Seated horizontal smooth pursuits L/R, up/down  Standing VOR L/R and up down on foam  Standing balance on foam with head turns L/R, up/down  Tandem stance L/R, R/L on blue pads w/ arm raises for perturbations x 10 each DNP  Wt shift on green shelby pads L to and from right, then stagger L/R with wt shift front to back  DLS on foam EC and then head turns EC      Intraoral release- sheet for home   Educated pt on ways to progress/modify vestibular exercises at home.     Therapeutic Activity 29563: dnp      Gait Training 54220: dnp      Manual Therapy 98295: dnp      Modalities: dnp      HEP /  Access Codes:   Copies in chart    Assessment:    Pt unsteady and challenged by there ex, but no dizziness with any exercises    Post-Treatment Symptoms:        Plan:    Cont to progress vestibular and balance exercises, incorporate LE strength as well. Added intra oral for tinnitus    Goals:   Active       PT Problem       dizziness       Start:  11/12/24    Expected End:  02/12/25       1. Patient to ambulate independently with their cane/walker with normal nikolai and symmetrical gait pattern   to reach their car in the driveway (400 feet) at their home safely.    2. Patient to ambulate independently with cane/walker within their home (200 feet) so patient will be able to   complete basic ADL's and household chores independently.    3. Patient to safely get on/off chair demonstrating proper transfer techniques independently to improve safe   functional mobility.     4. Patient will improve Dynamic Gait Index to 21/24 to reduce fall risk at home.    5. Patient will improve Activities Specific Balance Scale to <30% impairment to allow patient to walk in   crowded rooms without fear of falling.    6. Patient will improve Timed Up and Go Score to <12 seconds to allow patient to walk household distances   safely and reduce fall risk at home.     7. Patient will improve 30 second sit to stand test to >9 reps to allow patient the ability to transfer to/from   a chair safely indepedently.    8. Patient will demonstrate improved backward extension reactions in sitting to be able to effectively protect from   a fall eight of ten trials.     9. Patient to exhibit controlled lowering to  objects from the floor without compensations with distant   supervision five out of ten trials.    10. Patient will sit on the floor independently without support of hands so as not to fall over when reaching  for objects.    After 8 weeks of PT....    Pt will be able to drive for 30 minutes without exacerbation of symptoms.    Pt  will be able to complete 30 minutes of grocery shopping without exacerbation of symptoms.    Pt will be able to read for 30 minutes without exacerbation of symptoms.    Pt will be able to complete single leg balance for 5 seconds on L/R LE to allow pt to don pants safely.    Pt will be able to stand with eyes closed for 30 seconds to allow pt to wash hair in shower safely.     Pt will improve Dizziness Handicap Index to > 80% function to allow pt to walk in crowded room without symptom exacerbation.

## 2024-12-16 ENCOUNTER — TREATMENT (OUTPATIENT)
Dept: PHYSICAL THERAPY | Facility: CLINIC | Age: 74
End: 2024-12-16
Payer: MEDICARE

## 2024-12-16 DIAGNOSIS — R42 DIZZINESS AND GIDDINESS: ICD-10-CM

## 2024-12-16 DIAGNOSIS — H93.12 TINNITUS OF LEFT EAR: ICD-10-CM

## 2024-12-16 DIAGNOSIS — R42 VERTIGO: ICD-10-CM

## 2024-12-16 PROCEDURE — 97110 THERAPEUTIC EXERCISES: CPT | Mod: GP | Performed by: PHYSICAL THERAPIST

## 2024-12-16 PROCEDURE — 97112 NEUROMUSCULAR REEDUCATION: CPT | Mod: GP | Performed by: PHYSICAL THERAPIST

## 2024-12-16 ASSESSMENT — PAIN SCALES - GENERAL: PAINLEVEL_OUTOF10: 4

## 2024-12-16 ASSESSMENT — PAIN - FUNCTIONAL ASSESSMENT: PAIN_FUNCTIONAL_ASSESSMENT: 0-10

## 2024-12-16 NOTE — PROGRESS NOTES
Physical Therapy Treatment    Patient Name: Mckayla Barron  MRN: [id number]  Encounter date: 12/16/2024 PT Received On: 12/16/24  Time Calculation  Start Time: 1315  Stop Time: 1400  Time Calculation (min): 45 min  PT Therapeutic Procedures Time Entry  Neuromuscular Re-Education Time Entry: 30  Therapeutic Exercise Time Entry: 12    Visit # 5 of 10+  Visits/Dates Authorized: [place]    Current Problem:   Problem List Items Addressed This Visit    None  Visit Diagnoses         Codes    Vertigo     R42    Dizziness and giddiness     R42    Tinnitus of left ear     H93.12              Precautions:       Falls, dizziness, neuropathy BLE, tinnitus      Subjective   General:    Pt reports that her dizziness has been good for the last 6 days or so. She has not even been using her cane, however she notices she has had increased tinnitus over the past few days - she guesses that it might be due to the cold and windy weather.      Pre-Treatment Symptoms:   Pain Assessment: 0-10  0-10 (Numeric) Pain Score: 4 (tinnitus)    Objective   Findings:    No dizziness with any activitiy  DLS foam EC: 60      Treatments:      Therapeutic Exercise 31701:   UE bike fwd/rev x 6 min  Tband sidesteps and monsters F/B  3x ea- orange  Lean on counter hip ext- orange 2x12    Neuromuscular Re-Ed 16271:   Figure 8's between cones  Resisted gait forward and back 12.5# x4 each  Hurdles fwd/lateral 6 inch x 4 each L/R- no UE  RB rock with and without UE support and then balance x 1 min  Walking around objects w/ left turn x6  Seated horizontal smooth pursuits L/R, up/down  Standing VOR L/R and up down on foam  Standing balance on foam with head turns L/R, up/down  Tandem stance L/R, R/L on blue pads w/ arm raises for perturbations x 10 each  Wt shift on green shelby pads L to and from right, then stagger L/R with wt shift front to back  DLS on foam EC and then head turns EC      Intraoral release- sheet for home   Educated pt on ways to progress/modify  vestibular exercises at home.     Therapeutic Activity 82123: tommy      Gait Training 19952: tommy      Manual Therapy 65118: tommy  Dry needling inert/winding and withdraw to medial and lateral pterygoids    Modalities: tommy      HEP / Access Codes:   Copies in chart    Assessment:    Pt unsteady and challenged by there ex, but no dizziness with any exercises    Post-Treatment Symptoms:   Response to Interventions: Decrease in pain    Plan:    Cont to progress vestibular and balance exercises, incorporate LE strength as well. Added intra oral for tinnitus    Goals:   Active       PT Problem       dizziness       Start:  11/12/24    Expected End:  02/12/25       1. Patient to ambulate independently with their cane/walker with normal nikolai and symmetrical gait pattern   to reach their car in the driveway (400 feet) at their home safely.    2. Patient to ambulate independently with cane/walker within their home (200 feet) so patient will be able to   complete basic ADL's and household chores independently.    3. Patient to safely get on/off chair demonstrating proper transfer techniques independently to improve safe   functional mobility.     4. Patient will improve Dynamic Gait Index to 21/24 to reduce fall risk at home.    5. Patient will improve Activities Specific Balance Scale to <30% impairment to allow patient to walk in   crowded rooms without fear of falling.    6. Patient will improve Timed Up and Go Score to <12 seconds to allow patient to walk household distances   safely and reduce fall risk at home.     7. Patient will improve 30 second sit to stand test to >9 reps to allow patient the ability to transfer to/from   a chair safely indepedently.    8. Patient will demonstrate improved backward extension reactions in sitting to be able to effectively protect from   a fall eight of ten trials.     9. Patient to exhibit controlled lowering to  objects from the floor without compensations with distant    supervision five out of ten trials.    10. Patient will sit on the floor independently without support of hands so as not to fall over when reaching  for objects.    After 8 weeks of PT....    Pt will be able to drive for 30 minutes without exacerbation of symptoms.    Pt will be able to complete 30 minutes of grocery shopping without exacerbation of symptoms.    Pt will be able to read for 30 minutes without exacerbation of symptoms.    Pt will be able to complete single leg balance for 5 seconds on L/R LE to allow pt to don pants safely.    Pt will be able to stand with eyes closed for 30 seconds to allow pt to wash hair in shower safely.     Pt will improve Dizziness Handicap Index to > 80% function to allow pt to walk in crowded room without symptom exacerbation.

## 2024-12-17 ENCOUNTER — HOSPITAL ENCOUNTER (OUTPATIENT)
Dept: RADIOLOGY | Facility: CLINIC | Age: 74
Discharge: HOME | End: 2024-12-17
Payer: MEDICARE

## 2024-12-17 DIAGNOSIS — D48.7 NEOPLASM OF UNCERTAIN BEHAVIOR OF OTHER SPECIFIED SITES: ICD-10-CM

## 2024-12-17 PROCEDURE — A9575 INJ GADOTERATE MEGLUMI 0.1ML: HCPCS | Performed by: ORTHOPAEDIC SURGERY

## 2024-12-17 PROCEDURE — 73223 MRI JOINT UPR EXTR W/O&W/DYE: CPT | Mod: RT

## 2024-12-17 PROCEDURE — 2550000001 HC RX 255 CONTRASTS: Performed by: ORTHOPAEDIC SURGERY

## 2024-12-17 PROCEDURE — 73223 MRI JOINT UPR EXTR W/O&W/DYE: CPT | Mod: RIGHT SIDE | Performed by: RADIOLOGY

## 2024-12-17 RX ORDER — GADOTERATE MEGLUMINE 376.9 MG/ML
20 INJECTION INTRAVENOUS
Status: COMPLETED | OUTPATIENT
Start: 2024-12-17 | End: 2024-12-17

## 2025-01-23 ENCOUNTER — DOCUMENTATION (OUTPATIENT)
Dept: PHYSICAL THERAPY | Facility: CLINIC | Age: 75
End: 2025-01-23
Payer: MEDICARE

## 2025-01-23 NOTE — PROGRESS NOTES
"Physical Therapy                 Therapy Communication Note    Patient Name: Mckayla Barron \"Nikolai\"  MRN: [id number]  Department:   [facility]  Today's Date: 1/23/2025     Discipline: Physical Therapy          Comment: POC transfer to [facility]ino, PT. Primary PT transferring facilities.  "

## 2025-02-03 ENCOUNTER — APPOINTMENT (OUTPATIENT)
Dept: PHYSICAL THERAPY | Facility: CLINIC | Age: 75
End: 2025-02-03
Payer: MEDICARE

## 2025-02-04 ENCOUNTER — OFFICE VISIT (OUTPATIENT)
Dept: PRIMARY CARE | Facility: CLINIC | Age: 75
End: 2025-02-04
Payer: MEDICARE

## 2025-02-04 VITALS
WEIGHT: 250 LBS | TEMPERATURE: 97.7 F | HEART RATE: 73 BPM | OXYGEN SATURATION: 99 % | SYSTOLIC BLOOD PRESSURE: 138 MMHG | BODY MASS INDEX: 40.35 KG/M2 | DIASTOLIC BLOOD PRESSURE: 80 MMHG

## 2025-02-04 DIAGNOSIS — I48.0 PAROXYSMAL ATRIAL FIBRILLATION (MULTI): ICD-10-CM

## 2025-02-04 DIAGNOSIS — E66.01 MORBID (SEVERE) OBESITY DUE TO EXCESS CALORIES (MULTI): ICD-10-CM

## 2025-02-04 DIAGNOSIS — B02.9 HERPES ZOSTER WITHOUT COMPLICATION: Primary | ICD-10-CM

## 2025-02-04 PROCEDURE — 1036F TOBACCO NON-USER: CPT | Performed by: INTERNAL MEDICINE

## 2025-02-04 PROCEDURE — 1159F MED LIST DOCD IN RCRD: CPT | Performed by: INTERNAL MEDICINE

## 2025-02-04 PROCEDURE — 99213 OFFICE O/P EST LOW 20 MIN: CPT | Performed by: INTERNAL MEDICINE

## 2025-02-04 PROCEDURE — 1160F RVW MEDS BY RX/DR IN RCRD: CPT | Performed by: INTERNAL MEDICINE

## 2025-02-04 PROCEDURE — 1125F AMNT PAIN NOTED PAIN PRSNT: CPT | Performed by: INTERNAL MEDICINE

## 2025-02-04 RX ORDER — ACYCLOVIR 400 MG/1
400 TABLET ORAL
Qty: 50 TABLET | Refills: 0 | Status: SHIPPED | OUTPATIENT
Start: 2025-02-04 | End: 2025-02-14

## 2025-02-04 ASSESSMENT — ENCOUNTER SYMPTOMS
TROUBLE SWALLOWING: 0
FLANK PAIN: 0
STRIDOR: 0
NAUSEA: 0
NECK PAIN: 0
VOICE CHANGE: 0
ACTIVITY CHANGE: 0
FEVER: 0
HEMATURIA: 0
TREMORS: 0
VOMITING: 0
WHEEZING: 0
SLEEP DISTURBANCE: 0
ADENOPATHY: 0
WOUND: 0
CHEST TIGHTNESS: 0
CONSTIPATION: 0
CONFUSION: 0
NERVOUS/ANXIOUS: 0
BLOOD IN STOOL: 0
APPETITE CHANGE: 0
HEADACHES: 0
DIZZINESS: 0
DYSURIA: 0
FATIGUE: 0
WEAKNESS: 0
UNEXPECTED WEIGHT CHANGE: 0
ABDOMINAL PAIN: 0
ARTHRALGIAS: 0
DIARRHEA: 0
BACK PAIN: 0
MYALGIAS: 0
SEIZURES: 0
COLOR CHANGE: 1
SORE THROAT: 0
SINUS PAIN: 0
PHOTOPHOBIA: 0
NUMBNESS: 0
EYE PAIN: 0
COUGH: 0
PALPITATIONS: 0
JOINT SWELLING: 0
SHORTNESS OF BREATH: 0

## 2025-02-04 ASSESSMENT — PAIN SCALES - GENERAL: PAINLEVEL_OUTOF10: 8

## 2025-02-04 NOTE — PROGRESS NOTES
Subjective   Patient ID: Nikolai Barron is a 74 y.o. female who presents for Establish Care (Shingles possible ).    HPI   Patient presented to the office with the appearance of the red rash upper back which is painful.  She had 4 episode of shingles before and she thinks it is because of her shingles.    Review of Systems   Constitutional:  Negative for activity change, appetite change, fatigue, fever and unexpected weight change.   HENT:  Negative for dental problem, ear discharge, hearing loss, nosebleeds, postnasal drip, sinus pain, sore throat, trouble swallowing and voice change.    Eyes:  Negative for photophobia, pain and visual disturbance.   Respiratory:  Negative for cough, chest tightness, shortness of breath, wheezing and stridor.    Cardiovascular:  Negative for chest pain, palpitations and leg swelling.   Gastrointestinal:  Negative for abdominal pain, blood in stool, constipation, diarrhea, nausea and vomiting.   Endocrine: Negative for polyuria.   Genitourinary:  Negative for decreased urine volume, dysuria, flank pain, hematuria and urgency.   Musculoskeletal:  Negative for arthralgias, back pain, gait problem, joint swelling, myalgias and neck pain.   Skin:  Positive for color change and rash. Negative for wound.   Allergic/Immunologic: Negative for environmental allergies and food allergies.   Neurological:  Negative for dizziness, tremors, seizures, syncope, weakness, numbness and headaches.   Hematological:  Negative for adenopathy.   Psychiatric/Behavioral:  Negative for confusion, self-injury and sleep disturbance. The patient is not nervous/anxious.      Objective   /80   Pulse 73   Temp 36.5 °C (97.7 °F)   Wt 113 kg (250 lb)   SpO2 99%   BMI 40.35 kg/m²     Physical Exam  Vitals and nursing note reviewed.   Constitutional:       General: She is not in acute distress.     Appearance: She is obese. She is not ill-appearing or toxic-appearing.   HENT:      Nose: Nose normal. No  congestion.   Eyes:      Conjunctiva/sclera: Conjunctivae normal.   Cardiovascular:      Rate and Rhythm: Normal rate and regular rhythm.      Pulses: Normal pulses.      Heart sounds: Normal heart sounds.   Pulmonary:      Effort: Pulmonary effort is normal. No respiratory distress.      Breath sounds: Normal breath sounds. No stridor. No wheezing or rhonchi.   Musculoskeletal:         General: Normal range of motion.   Skin:     Findings: Rash present.             Comments: Site of shingles rash.   Neurological:      General: No focal deficit present.      Mental Status: She is alert and oriented to person, place, and time.   Psychiatric:         Mood and Affect: Mood normal.         Behavior: Behavior normal.       Assessment/Plan   Problem List Items Addressed This Visit       Paroxysmal atrial fibrillation (Multi)     Heart rate is controlled and patient is on carvedilol and xarelto.         Morbid (severe) obesity due to excess calories (Multi)     Lifestyle modification which include daily exercise at least for 45 minute and Low Calorie intake of 1800- 2000 Kcal per day.          Other Visit Diagnoses       Herpes zoster without complication    -  Primary    Relevant Medications    acyclovir (Zovirax) 400 mg tablet

## 2025-02-05 NOTE — ASSESSMENT & PLAN NOTE
Lifestyle modification which include daily exercise at least for 45 minute and Low Calorie intake of 1800- 2000 Kcal per day.

## 2025-02-10 ENCOUNTER — HOSPITAL ENCOUNTER (OUTPATIENT)
Dept: RADIOLOGY | Facility: HOSPITAL | Age: 75
Discharge: HOME | End: 2025-02-10
Payer: MEDICARE

## 2025-02-10 DIAGNOSIS — Z78.0 ASYMPTOMATIC MENOPAUSAL STATE: ICD-10-CM

## 2025-02-10 DIAGNOSIS — Z12.31 BREAST CANCER SCREENING BY MAMMOGRAM: ICD-10-CM

## 2025-02-10 PROCEDURE — 77063 BREAST TOMOSYNTHESIS BI: CPT

## 2025-02-10 PROCEDURE — 77080 DXA BONE DENSITY AXIAL: CPT | Performed by: RADIOLOGY

## 2025-02-10 PROCEDURE — 77080 DXA BONE DENSITY AXIAL: CPT

## 2025-02-13 ENCOUNTER — HOSPITAL ENCOUNTER (OUTPATIENT)
Dept: RADIOLOGY | Facility: EXTERNAL LOCATION | Age: 75
Discharge: HOME | End: 2025-02-13

## 2025-02-18 DIAGNOSIS — I48.0 PAROXYSMAL ATRIAL FIBRILLATION (MULTI): ICD-10-CM

## 2025-02-19 ENCOUNTER — APPOINTMENT (OUTPATIENT)
Dept: PHYSICAL THERAPY | Facility: CLINIC | Age: 75
End: 2025-02-19
Payer: MEDICARE

## 2025-03-06 DIAGNOSIS — E78.2 MIXED HYPERLIPIDEMIA: ICD-10-CM

## 2025-03-06 RX ORDER — EZETIMIBE 10 MG/1
10 TABLET ORAL DAILY
Qty: 90 TABLET | Refills: 1 | Status: SHIPPED | OUTPATIENT
Start: 2025-03-06

## 2025-03-18 ENCOUNTER — APPOINTMENT (OUTPATIENT)
Dept: PRIMARY CARE | Facility: CLINIC | Age: 75
End: 2025-03-18
Payer: MEDICARE

## 2025-03-25 ENCOUNTER — TELEPHONE (OUTPATIENT)
Dept: PRIMARY CARE | Facility: CLINIC | Age: 75
End: 2025-03-25
Payer: MEDICARE

## 2025-03-25 DIAGNOSIS — R19.7 DIARRHEA, UNSPECIFIED TYPE: Primary | ICD-10-CM

## 2025-03-25 NOTE — TELEPHONE ENCOUNTER
Patient called stating she has had diarrhea for about 6 days.  She thinks it's possible she has c-diff and feels very weak.  Can we order a c-diff test for her?

## 2025-03-27 ENCOUNTER — APPOINTMENT (OUTPATIENT)
Dept: PRIMARY CARE | Facility: CLINIC | Age: 75
End: 2025-03-27
Payer: MEDICARE

## 2025-03-27 DIAGNOSIS — I10 HYPERTENSION, UNSPECIFIED TYPE: Primary | ICD-10-CM

## 2025-03-27 LAB — C DIFF TOX GENS STL QL NAA+PROBE: NOT DETECTED

## 2025-03-27 RX ORDER — CARVEDILOL 6.25 MG/1
6.25 TABLET ORAL 2 TIMES DAILY
Qty: 180 TABLET | Refills: 3 | Status: SHIPPED | OUTPATIENT
Start: 2025-03-27

## 2025-04-16 ENCOUNTER — OFFICE VISIT (OUTPATIENT)
Dept: PRIMARY CARE | Facility: CLINIC | Age: 75
End: 2025-04-16
Payer: MEDICARE

## 2025-04-16 VITALS — OXYGEN SATURATION: 99 % | HEIGHT: 66 IN | HEART RATE: 78 BPM | WEIGHT: 252 LBS | BODY MASS INDEX: 40.5 KG/M2

## 2025-04-16 DIAGNOSIS — Z91.09 ENVIRONMENTAL ALLERGIES: ICD-10-CM

## 2025-04-16 DIAGNOSIS — F41.9 ANXIETY AND DEPRESSION: ICD-10-CM

## 2025-04-16 DIAGNOSIS — G89.29 CHRONIC BILATERAL LOW BACK PAIN WITH BILATERAL SCIATICA: ICD-10-CM

## 2025-04-16 DIAGNOSIS — E78.2 MIXED HYPERLIPIDEMIA: ICD-10-CM

## 2025-04-16 DIAGNOSIS — M54.42 CHRONIC BILATERAL LOW BACK PAIN WITH BILATERAL SCIATICA: ICD-10-CM

## 2025-04-16 DIAGNOSIS — I48.0 PAROXYSMAL ATRIAL FIBRILLATION (MULTI): ICD-10-CM

## 2025-04-16 DIAGNOSIS — R73.03 PREDIABETES: Primary | ICD-10-CM

## 2025-04-16 DIAGNOSIS — F32.A ANXIETY AND DEPRESSION: ICD-10-CM

## 2025-04-16 DIAGNOSIS — I10 HTN (HYPERTENSION), BENIGN: ICD-10-CM

## 2025-04-16 DIAGNOSIS — E03.9 ACQUIRED HYPOTHYROIDISM: ICD-10-CM

## 2025-04-16 DIAGNOSIS — K58.2 IRRITABLE BOWEL SYNDROME WITH BOTH CONSTIPATION AND DIARRHEA: ICD-10-CM

## 2025-04-16 DIAGNOSIS — M54.41 CHRONIC BILATERAL LOW BACK PAIN WITH BILATERAL SCIATICA: ICD-10-CM

## 2025-04-16 DIAGNOSIS — G44.201 ACUTE INTRACTABLE TENSION-TYPE HEADACHE: ICD-10-CM

## 2025-04-16 DIAGNOSIS — E03.8 OTHER SPECIFIED HYPOTHYROIDISM: ICD-10-CM

## 2025-04-16 PROCEDURE — 99204 OFFICE O/P NEW MOD 45 MIN: CPT | Performed by: FAMILY MEDICINE

## 2025-04-16 PROCEDURE — 1124F ACP DISCUSS-NO DSCNMKR DOCD: CPT | Performed by: FAMILY MEDICINE

## 2025-04-16 PROCEDURE — G2211 COMPLEX E/M VISIT ADD ON: HCPCS | Performed by: FAMILY MEDICINE

## 2025-04-16 PROCEDURE — 1126F AMNT PAIN NOTED NONE PRSNT: CPT | Performed by: FAMILY MEDICINE

## 2025-04-16 PROCEDURE — 99214 OFFICE O/P EST MOD 30 MIN: CPT | Performed by: FAMILY MEDICINE

## 2025-04-16 RX ORDER — TRIAMCINOLONE ACETONIDE 55 UG/1
2 SPRAY, METERED NASAL DAILY
Qty: 16.5 G | Refills: 11 | Status: SHIPPED | OUTPATIENT
Start: 2025-04-16 | End: 2026-04-16

## 2025-04-16 RX ORDER — DICYCLOMINE HYDROCHLORIDE 10 MG/1
10 CAPSULE ORAL 3 TIMES DAILY
Qty: 270 CAPSULE | Refills: 1 | Status: SHIPPED | OUTPATIENT
Start: 2025-04-16 | End: 2025-10-13

## 2025-04-16 RX ORDER — CARVEDILOL 6.25 MG/1
6.25 TABLET ORAL 2 TIMES DAILY
Qty: 180 TABLET | Refills: 3 | Status: SHIPPED | OUTPATIENT
Start: 2025-04-16

## 2025-04-16 RX ORDER — THYROID 60 MG/1
60 TABLET ORAL DAILY
Qty: 90 TABLET | Refills: 1 | Status: SHIPPED | OUTPATIENT
Start: 2025-04-16

## 2025-04-16 RX ORDER — BUPROPION HYDROCHLORIDE 100 MG/1
100 TABLET, EXTENDED RELEASE ORAL DAILY
Qty: 90 TABLET | Refills: 1 | Status: SHIPPED | OUTPATIENT
Start: 2025-04-16 | End: 2025-10-13

## 2025-04-16 RX ORDER — OXYMETAZOLINE HYDROCHLORIDE 0.05 G/100ML
2 SPRAY, METERED NASAL EVERY 12 HOURS PRN
Qty: 30 ML | Refills: 0 | Status: SHIPPED | OUTPATIENT
Start: 2025-04-16 | End: 2025-04-18

## 2025-04-16 RX ORDER — NALOXONE HCL
4.5 POWDER (GRAM) MISCELLANEOUS NIGHTLY
Qty: 4 G | Refills: 3 | Status: SHIPPED | OUTPATIENT
Start: 2025-04-16 | End: 2025-04-16 | Stop reason: WASHOUT

## 2025-04-16 RX ORDER — DULOXETIN HYDROCHLORIDE 60 MG/1
60 CAPSULE, DELAYED RELEASE ORAL DAILY
Qty: 90 CAPSULE | Refills: 1 | Status: SHIPPED | OUTPATIENT
Start: 2025-04-16

## 2025-04-16 RX ORDER — AZELASTINE 1 MG/ML
1 SPRAY, METERED NASAL 2 TIMES DAILY
Qty: 30 ML | Refills: 12 | Status: SHIPPED | OUTPATIENT
Start: 2025-04-16 | End: 2026-04-16

## 2025-04-16 RX ORDER — SUMATRIPTAN SUCCINATE 50 MG/1
50 TABLET ORAL ONCE AS NEEDED
Qty: 27 TABLET | Refills: 3 | Status: SHIPPED | OUTPATIENT
Start: 2025-04-16 | End: 2026-04-16

## 2025-04-16 RX ORDER — EZETIMIBE 10 MG/1
10 TABLET ORAL DAILY
Qty: 90 TABLET | Refills: 1 | Status: SHIPPED | OUTPATIENT
Start: 2025-04-16

## 2025-04-16 ASSESSMENT — ENCOUNTER SYMPTOMS
OCCASIONAL FEELINGS OF UNSTEADINESS: 0
LOSS OF SENSATION IN FEET: 0
DEPRESSION: 0

## 2025-04-16 ASSESSMENT — PAIN SCALES - GENERAL: PAINLEVEL_OUTOF10: 0-NO PAIN

## 2025-04-16 NOTE — PROGRESS NOTES
75-year presents to clinic for follow-up chronic medical conditions    1. Prediabetes   Doing lifestyle inventions last A1c 6.2 down from 6.4       3. Paroxysmal atrial fibrillation (Multi)   Patient will monitor Xarelto, currently rate controlled with carvedilol.  Might be interested in watchman if possible   4. Mixed hyperlipidemia   Currently on Zetia is statin intolerant has never had CT calcium score done       6. Acute intractable tension-type headache   Imitrex as needed works well   7. Acquired hypothyroidism   Uses Protivin Thyroid needs TSH rechecked   8. HTN (hypertension), benign   Well-controlled 138/80 today goal less than 130/80 follows with cardiology regularly   9. Irritable bowel syndrome with both constipation and diarrhea   Bentyl as needed works well   10. Anxiety and depression   On Cymbalta and Wellbutrin works well   11. Environmental allergies   Has been utilizing Xyzal without good benefit had good benefit to nasal sprays in the past   12. Chronic bilateral low back pain with bilateral sciatica   On compounded naltrexone 4.5 mg nightly which works well for her     All pertinent positive symptoms are included in history of present illness.    All other systems have been reviewed and are negative and noncontributory to this patient's current ailments.      CONSTITUTIONAL - INAD. Not ill appearing.  SKIN - No lesions or rashes visualized. No jaundice visualized.  HEENT- Atraumatic, normocephalic, no scleral icterus, external nares are not erythematous and without drainage, no neck masses visualized, oropharynx visualized and is without erythema or exudate  RESP - respiration not labored   CARDIAC - no grade 6 systolic murmurs auscultated  ABDOMEN - nondistended.  NEURO- CNs II-XII grossly intact        1. Hypertension, unspecified type  Stable continue Coreg  - carvedilol (Coreg) 6.25 mg tablet; Take 1 tablet (6.25 mg) by mouth 2 times a day.  Dispense: 180 tablet; Refill: 3  - Comprehensive  metabolic panel; Future  - Tsh With Reflex To Free T4 If Abnormal; Future  - Comprehensive metabolic panel  - Tsh With Reflex To Free T4 If Abnormal    2. Paroxysmal atrial fibrillation (Multi)  Inquire possible flecainide for pill in pocket inquire possible watchman with next visit to cardiology rate controlled and anticoagulated appropriately  - rivaroxaban (Xarelto) 20 mg tablet; Take 1 tablet (20 mg) by mouth once daily in the evening. Take with meals. Take with food.  Dispense: 90 tablet; Refill: 1  - Comprehensive metabolic panel; Future  - Tsh With Reflex To Free T4 If Abnormal; Future  - Comprehensive metabolic panel  - Tsh With Reflex To Free T4 If Abnormal    3. Mixed hyperlipidemia  Discussed with patient about the natural history and course of hyperlipidemia.  Discussed lifestyle as well as genetic implications of hyperlipidemia.      Discussed possible treatment options of hyperlipidemia including intensive lifestyle interventions including lower carbohydrate, lower saturated fat diet as well as increasing aerobic and resistance training exercise to at least 30 minutes daily 3 times a week, hopefully more.    Discussed medication options including the use of statin medications as well as the side effects of these medications.  Discussed goal LDL of <100 for primary prevention and <70 for secondary prevention.    Discussed the cost benefit analysis of lowering cholesterol and how it will help prevent heart attacks and strokes in the future, also discussed the benefit of statin medications and the large amount of studies showing a reduction in morbidity mortality with the use of a statin medication in the setting of multiple risk factors for heart attacks and strokes.    Discussed secondary risk stratification with CT Cardiac Scoring and utilizing the PREVENT calculator to determine if statin use with actually benefit patients.     If medication was prescribed or continued, the appropriate lab work was  ordered.  - ezetimibe (Zetia) 10 mg tablet; Take 1 tablet (10 mg) by mouth once daily.  Dispense: 90 tablet; Refill: 1  - Comprehensive metabolic panel; Future  - Tsh With Reflex To Free T4 If Abnormal; Future  - CT cardiac scoring wo IV contrast; Future  - Comprehensive metabolic panel  - Tsh With Reflex To Free T4 If Abnormal    4. Other specified hypothyroidism  The natural history and course of hypothyroidism was discussed at length with the patient.  Spoke with a different cause of hypothyroidism including acquired as well as idiopathic hypothyroidism.  Spoke about different symptoms look out for for hyper or hypothyroid symptoms including weight loss or gain, heat or cold intolerance, or sensations of palpitations/fatigue.  Spoke with the different treatment options with regard to hypothyroidism including levothyroxine.  If discussed, we spoke about how I do not like to use alternative thyroid medications such as Cytomel secondary to difficulty in dosing.  Spoke with the need to routinely monitor TSH and the patient to assess efficacy of treatment.  I would like to see the patient every 6 months to recheck TSH for medication adjustment.  Routine lab work was ordered today, medication was ordered and/or adjusted today.  - thyroid, pork, ([place] Thyroid) 60 mg tablet; Take 1 tablet (60 mg) by mouth once daily.  Dispense: 90 tablet; Refill: 1  - Comprehensive metabolic panel; Future  - Tsh With Reflex To Free T4 If Abnormal; Future  - Comprehensive metabolic panel  - Tsh With Reflex To Free T4 If Abnormal    5. Acute intractable tension-type headache  Well-controlled  - SUMAtriptan (Imitrex) 50 mg tablet; Take 1 tablet (50 mg) by mouth 1 time if needed for migraine. May repeat after 2 hours.  Dispense: 27 tablet; Refill: 3  - Comprehensive metabolic panel; Future  - Tsh With Reflex To Free T4 If Abnormal; Future  - Comprehensive metabolic panel  - Tsh With Reflex To Free T4 If Abnormal    6. Prediabetes  (Primary)  Continue lifestyle check A1c  - Comprehensive metabolic panel; Future  - Tsh With Reflex To Free T4 If Abnormal; Future  - Hemoglobin A1c; Future  - Comprehensive metabolic panel  - Tsh With Reflex To Free T4 If Abnormal  - Hemoglobin A1c    7. Acquired hypothyroidism    - Comprehensive metabolic panel; Future  - Tsh With Reflex To Free T4 If Abnormal; Future  - T3, free; Future  - T4, free; Future  - Comprehensive metabolic panel  - Tsh With Reflex To Free T4 If Abnormal  - T3, free  - T4, free    8. HTN (hypertension), benign    - Comprehensive metabolic panel; Future  - Tsh With Reflex To Free T4 If Abnormal; Future  - Comprehensive metabolic panel  - Tsh With Reflex To Free T4 If Abnormal    9. Irritable bowel syndrome with both constipation and diarrhea  Continue Bentyl  - dicyclomine (Bentyl) 10 mg capsule; Take 1 capsule (10 mg) by mouth 3 times a day.  Dispense: 270 capsule; Refill: 1  - Comprehensive metabolic panel; Future  - Tsh With Reflex To Free T4 If Abnormal; Future  - Comprehensive metabolic panel  - Tsh With Reflex To Free T4 If Abnormal    10. Anxiety and depression  Well-controlled  - buPROPion SR (Wellbutrin SR) 100 mg 12 hr tablet; Take 1 tablet (100 mg) by mouth once daily.  Dispense: 90 tablet; Refill: 1  - DULoxetine (Cymbalta) 60 mg DR capsule; Take 1 capsule (60 mg) by mouth once daily.  Dispense: 90 capsule; Refill: 1  - Comprehensive metabolic panel; Future  - Tsh With Reflex To Free T4 If Abnormal; Future  - Comprehensive metabolic panel  - Tsh With Reflex To Free T4 If Abnormal    11. Environmental allergies  The natural history and course of allergies was discussed at length with the patient.  We discussed different diagnostic modalities including RAST and patch testing as well as monitoring parameters.  Spoke about the need for treatment of allergies and prevention of allergic reactions.  Spoke about the avoidance of triggers such as environmental allergens.  Spoke with  the need to control environment including utilizing good filtration like HEPA filters at home as well as utilizing allergy medications as necessary to prevent exacerbations.  Spoke about the different treatments for allergies including intranasal corticosteroids, intranasal antihistamines, oral antihistamines, oral steroids, oral leukotriene antagonists as well as different side effects of these medications that could be expected.  If the allergy is anaphylactic, spoke about the use of epinephrine injectors and the need to have one with them at all times.  Spoke about the correct way to use these medications.  Spoke about the red flag signs and symptoms to look out for in terms of allergy exacerbations and when to go to the emergency department.  Depending on the severity of the allergy, the appropriate diagnostic and treatment protocol was prescribed for the patient and the patient made an informed decision about the treatment protocol at this time.  Patient was informed of the appropriate follow-up time for their condition.  - azelastine (Astelin) 137 mcg (0.1 %) nasal spray; Administer 1 spray into each nostril 2 times a day. Use in each nostril as directed  Dispense: 30 mL; Refill: 12  - triamcinolone (Nasacort) 55 mcg nasal inhaler; Administer 2 sprays into each nostril once daily.  Dispense: 16.5 g; Refill: 11  - oxymetazoline (Afrin, oxymetazoline,) 0.05 % nasal spray; Administer 2 sprays into each nostril every 12 hours if needed for congestion for up to 2 days. Do not use for more than 3 days.  Dispense: 30 mL; Refill: 0    12. Chronic bilateral low back pain with bilateral sciatica  Well-controlled  - naloxone HCl (naloxone, bulk,) powder; Take 4.5 mg by mouth once daily at bedtime.  Dispense: 4 g; Refill: 3

## 2025-04-16 NOTE — TELEPHONE ENCOUNTER
Incorrect medication was in chart under nasal spray naloxone, incorrectly refilled.  Corrected prescription sent to pharmacy

## 2025-04-17 LAB
ALBUMIN SERPL-MCNC: 3.9 G/DL (ref 3.6–5.1)
ALP SERPL-CCNC: 68 U/L (ref 37–153)
ALT SERPL-CCNC: 14 U/L (ref 6–29)
ANION GAP SERPL CALCULATED.4IONS-SCNC: 7 MMOL/L (CALC) (ref 7–17)
AST SERPL-CCNC: 19 U/L (ref 10–35)
BILIRUB SERPL-MCNC: 0.5 MG/DL (ref 0.2–1.2)
BUN SERPL-MCNC: 23 MG/DL (ref 7–25)
CALCIUM SERPL-MCNC: 9.3 MG/DL (ref 8.6–10.4)
CHLORIDE SERPL-SCNC: 103 MMOL/L (ref 98–110)
CO2 SERPL-SCNC: 31 MMOL/L (ref 20–32)
CREAT SERPL-MCNC: 0.7 MG/DL (ref 0.6–1)
EGFRCR SERPLBLD CKD-EPI 2021: 90 ML/MIN/1.73M2
EST. AVERAGE GLUCOSE BLD GHB EST-MCNC: 174 MG/DL
EST. AVERAGE GLUCOSE BLD GHB EST-SCNC: 9.7 MMOL/L
GLUCOSE SERPL-MCNC: 148 MG/DL (ref 65–99)
HBA1C MFR BLD: 7.7 %
POTASSIUM SERPL-SCNC: 3.8 MMOL/L (ref 3.5–5.3)
PROT SERPL-MCNC: 6.2 G/DL (ref 6.1–8.1)
SODIUM SERPL-SCNC: 141 MMOL/L (ref 135–146)
T3FREE SERPL-MCNC: 3.1 PG/ML (ref 2.3–4.2)
T4 FREE SERPL-MCNC: 1.1 NG/DL (ref 0.8–1.8)
TSH SERPL-ACNC: 1.14 MIU/L (ref 0.4–4.5)

## 2025-07-01 NOTE — PROGRESS NOTES
Cardiac Electrophysiology Office Visit   I had the pleasure seeing Mckayla Barron    Current Outpatient Medications   Medication Instructions    albuterol (Ventolin HFA) 90 mcg/actuation inhaler 2 puffs, inhalation, Every 4 hours PRN    azelastine (Astelin) 137 mcg (0.1 %) nasal spray 1 spray, Each Nostril, 2 times daily, Use in each nostril as directed    buPROPion SR (WELLBUTRIN SR) 100 mg, oral, Daily    carvedilol (COREG) 6.25 mg, oral, 2 times daily    cetirizine (ZyrTEC) 10 mg tablet Every 24 hours    clonazePAM (KLONOPIN) 0.25 mg, Nightly    dicyclomine (BENTYL) 10 mg, oral, 3 times daily    DULoxetine (CYMBALTA) 60 mg, oral, Daily    ezetimibe (ZETIA) 10 mg, oral, Daily    famotidine (PEPCID) 20 mg    ketorolac (TORADOL) 10 mg, Every 8 hours PRN    levocetirizine (Xyzal) 5 mg tablet Every 24 hours    metFORMIN (GLUCOPHAGE) 500 mg, oral, 2 times daily (morning and late afternoon)    naltrexone 4.5 mg, oral, Nightly    oxymetazoline (Afrin, oxymetazoline,) 0.05 % nasal spray 2 sprays, Each Nostril, Every 12 hours PRN, Do not use for more than 3 days.    rivaroxaban (XARELTO) 20 mg, oral, Daily with evening meal, Take with food.    SUMAtriptan (IMITREX) 50 mg, oral, Once as needed, May repeat after 2 hours.    thyroid (pork) ([place] THYROID) 60 mg, oral, Daily    triamcinolone (Nasacort) 55 mcg nasal inhaler 2 sprays, Each Nostril, Daily      Subjective    Mckayla Barron is a 75 y.o. female .        Chief Complaint   Patient presents with    Atrial Fibrillation     OUTPATIENT CONSULTATION: Cardiac Electrophysiology  DOS: July 07, 2025    REASON: Atrial Fibrillation - Evaluation and Treatment   REFERRING: paris Chamorro MD    HPI     Mckayla Barron has a past medical history of Depression, HTN, Hypothyroidism, Tremor, Obesity, PTSD, Anxiety, KESHA, DM T2, RECURRENT FALLS (compromised mobility / unsteady gait, needs a cane to walk.    CARDIAC HISTORY:  AF - Index Dx 2011. S/p PVI 2011 performed by me.  She is on Xarelto for OAC. Her episodes of A-fib are symptomatic but relatively infrequent.   [id number] Score 5  Pt has compromised mobility with recurrent falls and is concerned about long term use of blood thinners     [id number] Score  Age >= 75: 2   Sex Female: 1   CHF History No: 0   HTN Yes: 1   Stroke/TIA/Thromboembolism No: 0   Vascular Dz: CAD/PAD/Aortic Plaque No: 0   DM Yes: 1   Total Score 5       Lab Review:   Lab Results   Component Value Date    WBC 5.0 05/03/2024    HGB 15.0 05/03/2024    HCT 46.6 (H) 05/03/2024     05/03/2024    CHOL 199 09/13/2024    TRIG 161 (H) 09/13/2024    HDL 51.5 09/13/2024    ALT 14 04/16/2025    AST 19 04/16/2025     04/16/2025    K 3.8 04/16/2025     04/16/2025    CREATININE 0.70 04/16/2025    BUN 23 04/16/2025    CO2 31 04/16/2025    TSH 1.14 04/16/2025    INR 0.9 01/28/2021    HGBA1C 7.7 (H) 04/16/2025       Review of Systems   Constitutional: Positive for malaise/fatigue.   HENT: Negative.     Eyes: Negative.    Cardiovascular:  Positive for dyspnea on exertion, irregular heartbeat and palpitations.   Respiratory: Negative.     Endocrine: Negative.    Skin: Negative.    Musculoskeletal:  Positive for arthritis, back pain, falls, joint pain and muscle weakness.   Gastrointestinal: Negative.    Genitourinary: Negative.    Neurological:  Positive for loss of balance and weakness.   Psychiatric/Behavioral:  Positive for depression.         Objective    Constitutional:       Appearance: Healthy appearance. Not in distress.   Eyes:      Pupils: Pupils are equal, round, and reactive to light.   Neck:      Thyroid: Thyroid normal.      Vascular: JVD normal.   Pulmonary:      Effort: Pulmonary effort is normal.      Breath sounds: Normal breath sounds.   Cardiovascular:      Normal rate. Regular rhythm. S1 with normal intensity. S2 with normal intensity.       Murmurs: There is no murmur.      No gallop.  No click. No rub.   Edema:     Peripheral edema  absent.   Musculoskeletal: Normal range of motion.      Cervical back: Normal range of motion and neck supple. Skin:     General: Skin is warm and dry.   Neurological:      General: No focal deficit present.      Mental Status: Alert and oriented to person, place and time.          ASSESSMENT / PLAN:  PRESENTS FOR TO DISCUSS MANEGEMENT OPTIONS FOR SYMPTOMATIC PAROXYSMAL AF AND STROKE PREVENTION    WE DISCUSSED THE RISKS AND BENEFITS OF CATHETER ABLATION FOR SYMPTOMATIC PAROXYSMAL AF.    WE ALSO DISCUSSED THE RISKS AND BENEFITS OF KRISTINE CLOSURE PROCEDURE ASSOCIATED WITH THE WATCHMAN DEVICE AS A NON PHARMACOLOGIC STRATEGY FOR STROKE PREVENTION IN THE SETTINGS OF AFIB AND CONTRAINDICATION FOR LONG-TERM ORAL ANTICOAGULATION.   The Watchman device would be acritical measure to reduce the risk of complications related to anticoagulant therapy while effectively addressing the patients clinical needs.    SHE WILL MAKE A FINAL SHARED DECISION REGARDING THE REFERRAL FOR LAAO WITH DR. ALEXANDER    SCHEDULE AF ABLATION AND FOR WATCHMAN IMPLANT UNDER GENERAL ANESTHESIA   HOLD ALL MEDS IN THE MORNING OF ABLATION  CONTINUE XARELTO FOR 3 MO POST ABLATION   THEN DC XARELTO AND START ASPIRIN 81 mg DAILY      “Our strategy is to perform cardiac CT following left atrial appendage closure for device surveillance. Cardiac CTA has been shown to be more sensitive for detection of device leak and device related thrombus than BABAR, and in addition offers a non-invasive modality with less risk for the patient than BABAR which requires esophageal intubation with anesthesia.”    MD Kelvin Nolen Master Clinician of Cardiovascular [place].  [facility] [place].  Director of Electrophysiology Center  Professor of Medicine.  [employer].

## 2025-07-07 ENCOUNTER — OFFICE VISIT (OUTPATIENT)
Dept: CARDIOLOGY | Facility: CLINIC | Age: 75
End: 2025-07-07
Payer: MEDICARE

## 2025-07-07 VITALS
DIASTOLIC BLOOD PRESSURE: 129 MMHG | WEIGHT: 236.56 LBS | HEART RATE: 78 BPM | BODY MASS INDEX: 40.39 KG/M2 | SYSTOLIC BLOOD PRESSURE: 153 MMHG | HEIGHT: 64 IN | OXYGEN SATURATION: 96 %

## 2025-07-07 DIAGNOSIS — R26.89 BALANCE DISORDER: ICD-10-CM

## 2025-07-07 DIAGNOSIS — Z98.890 HISTORY OF CARDIAC RADIOFREQUENCY ABLATION: ICD-10-CM

## 2025-07-07 DIAGNOSIS — I48.0 PAROXYSMAL ATRIAL FIBRILLATION (MULTI): Primary | ICD-10-CM

## 2025-07-07 DIAGNOSIS — R29.6 RECURRENT FALLS: ICD-10-CM

## 2025-07-07 DIAGNOSIS — M54.50 LOW BACK PAIN, UNSPECIFIED: ICD-10-CM

## 2025-07-07 DIAGNOSIS — Z91.89 AT RISK FOR STROKE: ICD-10-CM

## 2025-07-07 PROCEDURE — 1126F AMNT PAIN NOTED NONE PRSNT: CPT | Performed by: INTERNAL MEDICINE

## 2025-07-07 PROCEDURE — 1160F RVW MEDS BY RX/DR IN RCRD: CPT | Performed by: INTERNAL MEDICINE

## 2025-07-07 PROCEDURE — 1123F ACP DISCUSS/DSCN MKR DOCD: CPT | Performed by: INTERNAL MEDICINE

## 2025-07-07 PROCEDURE — 99212 OFFICE O/P EST SF 10 MIN: CPT

## 2025-07-07 PROCEDURE — 93010 ELECTROCARDIOGRAM REPORT: CPT | Performed by: INTERNAL MEDICINE

## 2025-07-07 PROCEDURE — 99214 OFFICE O/P EST MOD 30 MIN: CPT | Performed by: INTERNAL MEDICINE

## 2025-07-07 PROCEDURE — 1159F MED LIST DOCD IN RCRD: CPT | Performed by: INTERNAL MEDICINE

## 2025-07-07 RX ORDER — DESVENLAFAXINE 50 MG/1
50 TABLET, EXTENDED RELEASE ORAL DAILY
COMMUNITY

## 2025-07-07 ASSESSMENT — COLUMBIA-SUICIDE SEVERITY RATING SCALE - C-SSRS
2. HAVE YOU ACTUALLY HAD ANY THOUGHTS OF KILLING YOURSELF?: NO
6. HAVE YOU EVER DONE ANYTHING, STARTED TO DO ANYTHING, OR PREPARED TO DO ANYTHING TO END YOUR LIFE?: NO
1. IN THE PAST MONTH, HAVE YOU WISHED YOU WERE DEAD OR WISHED YOU COULD GO TO SLEEP AND NOT WAKE UP?: NO

## 2025-07-07 ASSESSMENT — ENCOUNTER SYMPTOMS
DEPRESSION: 0
LOSS OF SENSATION IN FEET: 0
OCCASIONAL FEELINGS OF UNSTEADINESS: 1

## 2025-07-07 ASSESSMENT — PAIN SCALES - GENERAL: PAINLEVEL_OUTOF10: 0-NO PAIN

## 2025-07-10 PROBLEM — R29.6 RECURRENT FALLS: Status: ACTIVE | Noted: 2025-07-10

## 2025-07-10 ASSESSMENT — ENCOUNTER SYMPTOMS
DYSPNEA ON EXERTION: 1
BACK PAIN: 1
EYES NEGATIVE: 1
ENDOCRINE NEGATIVE: 1
PALPITATIONS: 1
RESPIRATORY NEGATIVE: 1
WEAKNESS: 1
LOSS OF BALANCE: 1
FALLS: 1
IRREGULAR HEARTBEAT: 1
DEPRESSION: 1
GASTROINTESTINAL NEGATIVE: 1

## 2025-07-11 ENCOUNTER — DOCUMENTATION (OUTPATIENT)
Dept: CARDIOLOGY | Facility: CLINIC | Age: 75
End: 2025-07-11
Payer: MEDICARE

## 2025-07-11 DIAGNOSIS — I48.91 ATRIAL FIBRILLATION, UNSPECIFIED TYPE (MULTI): ICD-10-CM

## 2025-07-11 NOTE — PROGRESS NOTES
(                          Shared Decision Tool - Referral for Left Atrial Appendage Occlusion    My patient Mckayla Barron 1950 has been evaluated by me and is referred to [facility] for a left atrial appendage implant due thromboembolic stroke risk from atrial fibrillation with CHADS2 score >= 2 or a [id number] score >= 3.    This patient is not a good candidate for long term anticoagulation for the following reason(s):    This patient however should be able to tolerate short term anticoagulation as necessary for LAAO device implant.  My Patient and I, the referring physician, have reviewed the following:  Yes  Atrial Fibrillation increases the risk of stroke.  Yes Anticoagulants or blood thinners help reduce the risk of stroke for most people.  Yes    Blood thinners may cause minor or serious bleeding issues.  Yes  Blood thinners include the medications aspirin, warfarin, and NOAC (dabigatran, edoxaban, rivaroxaban, apixaban...), each with unique risk and safety profiles.  Yes We reviewed his/her anticoagulation history and previous experiences.  Yes We discussed lack of other alternative treatments available to prevent stroke risk.  Yes We discussed the LAAO device implant vs taking anticoagulation to reduce stroke risk.  Yes We referred the patient to a LAAO outpatient clinic for further explanation and consideration.          Yes The LAAO device has been adequately explained along with the risks and benefits of treatment. Alternative treatment has been discussed with all questions answered. After discussion of the above considerations, it has been decided to be evaluated for the LAAO therapies.    Reviewed and approved by ROCHELLE ALEXANDER on 7/11/25 at 1:45 PM.     The patient is a 54y Male complaining of see chief complaint quote.

## 2025-07-18 LAB
ALBUMIN SERPL-MCNC: 4.2 G/DL (ref 3.6–5.1)
ALP SERPL-CCNC: 50 U/L (ref 37–153)
ALT SERPL-CCNC: 14 U/L (ref 6–29)
ANION GAP SERPL CALCULATED.4IONS-SCNC: 8 MMOL/L (CALC) (ref 7–17)
AST SERPL-CCNC: 21 U/L (ref 10–35)
BILIRUB SERPL-MCNC: 0.5 MG/DL (ref 0.2–1.2)
BUN SERPL-MCNC: 19 MG/DL (ref 7–25)
CALCIUM SERPL-MCNC: 9.6 MG/DL (ref 8.6–10.4)
CHLORIDE SERPL-SCNC: 106 MMOL/L (ref 98–110)
CO2 SERPL-SCNC: 26 MMOL/L (ref 20–32)
CREAT SERPL-MCNC: 0.63 MG/DL (ref 0.6–1)
EGFRCR SERPLBLD CKD-EPI 2021: 92 ML/MIN/1.73M2
EST. AVERAGE GLUCOSE BLD GHB EST-MCNC: 137 MG/DL
EST. AVERAGE GLUCOSE BLD GHB EST-SCNC: 7.6 MMOL/L
GLUCOSE SERPL-MCNC: 127 MG/DL (ref 65–99)
HBA1C MFR BLD: 6.4 %
POTASSIUM SERPL-SCNC: 4.1 MMOL/L (ref 3.5–5.3)
PROT SERPL-MCNC: 6.4 G/DL (ref 6.1–8.1)
SODIUM SERPL-SCNC: 140 MMOL/L (ref 135–146)
T3FREE SERPL-MCNC: 3.6 PG/ML (ref 2.3–4.2)
T4 FREE SERPL-MCNC: 1.1 NG/DL (ref 0.8–1.8)
TSH SERPL-ACNC: 0.91 MIU/L (ref 0.4–4.5)

## 2025-07-21 DIAGNOSIS — E11.9 TYPE 2 DIABETES MELLITUS WITHOUT COMPLICATION, WITHOUT LONG-TERM CURRENT USE OF INSULIN: ICD-10-CM

## 2025-07-21 RX ORDER — METFORMIN HYDROCHLORIDE 500 MG/1
500 TABLET ORAL
Qty: 180 TABLET | Refills: 0 | Status: SHIPPED | OUTPATIENT
Start: 2025-07-21 | End: 2025-07-23 | Stop reason: ALTCHOICE

## 2025-07-22 ENCOUNTER — HOSPITAL ENCOUNTER (OUTPATIENT)
Dept: CARDIOLOGY | Facility: HOSPITAL | Age: 75
Discharge: HOME | End: 2025-07-22
Payer: MEDICARE

## 2025-07-22 ENCOUNTER — TELEPHONE (OUTPATIENT)
Dept: CARDIOLOGY | Facility: HOSPITAL | Age: 75
End: 2025-07-22
Payer: MEDICARE

## 2025-07-22 PROCEDURE — 93005 ELECTROCARDIOGRAM TRACING: CPT

## 2025-07-22 NOTE — TELEPHONE ENCOUNTER
Called patient to give Dr. Chamorro response , no answer, left triage number to call with Dr. Chamorro's response [phone]  
1

## 2025-07-23 ENCOUNTER — OFFICE VISIT (OUTPATIENT)
Dept: PRIMARY CARE | Facility: CLINIC | Age: 75
End: 2025-07-23
Payer: MEDICARE

## 2025-07-23 ENCOUNTER — PATIENT MESSAGE (OUTPATIENT)
Dept: CARDIOLOGY | Facility: CLINIC | Age: 75
End: 2025-07-23

## 2025-07-23 VITALS
SYSTOLIC BLOOD PRESSURE: 136 MMHG | BODY MASS INDEX: 40.12 KG/M2 | HEIGHT: 64 IN | HEART RATE: 84 BPM | WEIGHT: 235 LBS | OXYGEN SATURATION: 96 % | DIASTOLIC BLOOD PRESSURE: 62 MMHG

## 2025-07-23 DIAGNOSIS — E78.2 MIXED HYPERLIPIDEMIA: Primary | ICD-10-CM

## 2025-07-23 DIAGNOSIS — E03.9 ACQUIRED HYPOTHYROIDISM: ICD-10-CM

## 2025-07-23 DIAGNOSIS — F32.A ANXIETY AND DEPRESSION: ICD-10-CM

## 2025-07-23 DIAGNOSIS — F41.9 ANXIETY AND DEPRESSION: ICD-10-CM

## 2025-07-23 DIAGNOSIS — E11.9 TYPE 2 DIABETES MELLITUS WITHOUT COMPLICATION, WITHOUT LONG-TERM CURRENT USE OF INSULIN: ICD-10-CM

## 2025-07-23 DIAGNOSIS — E03.8 OTHER SPECIFIED HYPOTHYROIDISM: ICD-10-CM

## 2025-07-23 DIAGNOSIS — I10 HTN (HYPERTENSION), BENIGN: ICD-10-CM

## 2025-07-23 PROCEDURE — 1126F AMNT PAIN NOTED NONE PRSNT: CPT | Performed by: FAMILY MEDICINE

## 2025-07-23 PROCEDURE — 1036F TOBACCO NON-USER: CPT | Performed by: FAMILY MEDICINE

## 2025-07-23 PROCEDURE — 3075F SYST BP GE 130 - 139MM HG: CPT | Performed by: FAMILY MEDICINE

## 2025-07-23 PROCEDURE — 3078F DIAST BP <80 MM HG: CPT | Performed by: FAMILY MEDICINE

## 2025-07-23 PROCEDURE — G2211 COMPLEX E/M VISIT ADD ON: HCPCS | Performed by: FAMILY MEDICINE

## 2025-07-23 PROCEDURE — 1159F MED LIST DOCD IN RCRD: CPT | Performed by: FAMILY MEDICINE

## 2025-07-23 PROCEDURE — 99214 OFFICE O/P EST MOD 30 MIN: CPT | Performed by: FAMILY MEDICINE

## 2025-07-23 RX ORDER — BUPROPION HYDROCHLORIDE 100 MG/1
100 TABLET, EXTENDED RELEASE ORAL DAILY
Qty: 90 TABLET | Refills: 1 | Status: SHIPPED | OUTPATIENT
Start: 2025-07-23 | End: 2026-01-19

## 2025-07-23 RX ORDER — EZETIMIBE 10 MG/1
10 TABLET ORAL DAILY
Qty: 90 TABLET | Refills: 1 | Status: SHIPPED | OUTPATIENT
Start: 2025-07-23

## 2025-07-23 RX ORDER — CARVEDILOL 6.25 MG/1
6.25 TABLET ORAL 2 TIMES DAILY
Qty: 180 TABLET | Refills: 3 | Status: SHIPPED | OUTPATIENT
Start: 2025-07-23

## 2025-07-23 RX ORDER — THYROID 60 MG/1
60 TABLET ORAL DAILY
Qty: 90 TABLET | Refills: 1 | Status: SHIPPED | OUTPATIENT
Start: 2025-07-23

## 2025-07-23 ASSESSMENT — PATIENT HEALTH QUESTIONNAIRE - PHQ9
1. LITTLE INTEREST OR PLEASURE IN DOING THINGS: NOT AT ALL
2. FEELING DOWN, DEPRESSED OR HOPELESS: NOT AT ALL
SUM OF ALL RESPONSES TO PHQ9 QUESTIONS 1 AND 2: 0

## 2025-07-23 ASSESSMENT — PAIN SCALES - GENERAL: PAINLEVEL_OUTOF10: 0-NO PAIN

## 2025-07-23 ASSESSMENT — ENCOUNTER SYMPTOMS
LOSS OF SENSATION IN FEET: 0
OCCASIONAL FEELINGS OF UNSTEADINESS: 0
DEPRESSION: 0

## 2025-07-23 ASSESSMENT — COLUMBIA-SUICIDE SEVERITY RATING SCALE - C-SSRS
6. HAVE YOU EVER DONE ANYTHING, STARTED TO DO ANYTHING, OR PREPARED TO DO ANYTHING TO END YOUR LIFE?: NO
2. HAVE YOU ACTUALLY HAD ANY THOUGHTS OF KILLING YOURSELF?: NO
1. IN THE PAST MONTH, HAVE YOU WISHED YOU WERE DEAD OR WISHED YOU COULD GO TO SLEEP AND NOT WAKE UP?: NO

## 2025-07-23 NOTE — PROGRESS NOTES
75-year old presents for follow-up      1. Mixed hyperlipidemia   Most recent LDL was at goal on current therapy has been improving lifestyle and doing diet has lost 20 pounds       3. Type 2 diabetes mellitus without complication, without long-term current use of insulin   Recent diagnosis with A1c of 7.7.  Trialed metformin but suffered GI side effects of discontinued it after only a week or so of taking it.  Has been initiating a very low carbohydrate diet and has been noticing some substantial improvements in her quality of life.  Her sugars have improved drastically, her appetite has gone, she is eating far less food, her energy levels have increased substantially, she has lost around 20 pounds in the last 3 months, she states she feels fantastic, other things have also improved including her tremor which she has no explanation for, random pains in her back and upper extremities which are also improving.    Her A1c went from 7.7-6.4 in 3 months With just dietary invention.     4. Acquired hypothyroidism   Last TSH at goal on [place] Thyroid   5. HTN (hypertension), benign   136/62 on current therapy   6. Anxiety and depression   Stable on current therapy     All pertinent positive symptoms are included in history of present illness.    All other systems have been reviewed and are negative and noncontributory to this patient's current ailments.      CONSTITUTIONAL - INAD. Not ill appearing.  SKIN - No lesions or rashes visualized. No jaundice visualized.  HEENT- Atraumatic, normocephalic, no scleral icterus, external nares are not erythematous and without drainage, no neck masses visualized, oropharynx visualized and is without erythema or exudate  RESP - respiration not labored   CARDIAC - no grade 6 systolic murmurs auscultated  ABDOMEN - nondistended.  NEURO- CNs II-XII grossly intact        1. Other specified hypothyroidism      2. Mixed hyperlipidemia (Primary)  Well-controlled continue medication continue  dietary invention  - ezetimibe (Zetia) 10 mg tablet; Take 1 tablet (10 mg) by mouth once daily.  Dispense: 90 tablet; Refill: 1    3. Type 2 diabetes mellitus without complication, without long-term current use of insulin  Substantial improvement with dietary intervention alone.  Quality of life is increased extensively, multiple other symptoms have improved, and your weight is reducing for the first time in years despite previous attempts with alternative therapies, my recommendation would be for you to continue a ketogenic diet.  Continue monitoring A1c every 3 months.  Continue working on weight loss efforts  Work on eating a well formulated ketogenic diet high in unsaturated fats, healthy protein, naturally occurring food sources.    4. Acquired hypothyroidism  Well-controlled  - thyroid, pork, ([place] Thyroid) 60 mg tablet; Take 1 tablet (60 mg) by mouth once daily.  Dispense: 90 tablet; Refill: 1    5. HTN (hypertension), benign  Well-controlled - carvedilol (Coreg) 6.25 mg tablet; Take 1 tablet (6.25 mg) by mouth 2 times a day.  Dispense: 180 tablet; Refill: 3    6. Anxiety and depression  Well-controlled  - buPROPion SR (Wellbutrin SR) 100 mg 12 hr tablet; Take 1 tablet (100 mg) by mouth once daily.  Dispense: 90 tablet; Refill: 1

## 2025-07-30 DIAGNOSIS — I48.0 PAROXYSMAL ATRIAL FIBRILLATION (MULTI): Primary | ICD-10-CM

## 2025-08-01 ENCOUNTER — HOSPITAL ENCOUNTER (OUTPATIENT)
Dept: RADIOLOGY | Facility: HOSPITAL | Age: 75
Discharge: HOME | End: 2025-08-01
Payer: MEDICARE

## 2025-08-01 DIAGNOSIS — E78.2 MIXED HYPERLIPIDEMIA: ICD-10-CM

## 2025-08-01 PROCEDURE — 75571 CT HRT W/O DYE W/CA TEST: CPT

## 2025-08-05 ENCOUNTER — RESULTS FOLLOW-UP (OUTPATIENT)
Dept: PRIMARY CARE | Facility: CLINIC | Age: 75
End: 2025-08-05
Payer: MEDICARE

## 2025-08-05 DIAGNOSIS — F41.9 ANXIETY AND DEPRESSION: Primary | ICD-10-CM

## 2025-08-05 DIAGNOSIS — F32.A ANXIETY AND DEPRESSION: Primary | ICD-10-CM

## 2025-08-13 RX ORDER — BUPROPION HYDROCHLORIDE 100 MG/1
100 TABLET ORAL DAILY
Qty: 90 TABLET | Refills: 0 | Status: SHIPPED | OUTPATIENT
Start: 2025-08-13 | End: 2025-11-11

## 2025-08-14 LAB
ANION GAP SERPL CALCULATED.4IONS-SCNC: 10 MMOL/L (CALC) (ref 7–17)
BUN SERPL-MCNC: 19 MG/DL (ref 7–25)
BUN/CREAT SERPL: ABNORMAL (CALC) (ref 6–22)
CALCIUM SERPL-MCNC: 9.3 MG/DL (ref 8.6–10.4)
CHLORIDE SERPL-SCNC: 105 MMOL/L (ref 98–110)
CO2 SERPL-SCNC: 28 MMOL/L (ref 20–32)
CREAT SERPL-MCNC: 0.66 MG/DL (ref 0.6–1)
EGFRCR SERPLBLD CKD-EPI 2021: 91 ML/MIN/1.73M2
ERYTHROCYTE [DISTWIDTH] IN BLOOD BY AUTOMATED COUNT: 12.4 % (ref 11–15)
GLUCOSE SERPL-MCNC: 117 MG/DL (ref 65–99)
HCT VFR BLD AUTO: 44.9 % (ref 35–45)
HGB BLD-MCNC: 14.6 G/DL (ref 11.7–15.5)
MCH RBC QN AUTO: 30 PG (ref 27–33)
MCHC RBC AUTO-ENTMCNC: 32.5 G/DL (ref 32–36)
MCV RBC AUTO: 92.2 FL (ref 80–100)
PLATELET # BLD AUTO: 215 THOUSAND/UL (ref 140–400)
PMV BLD REES-ECKER: 9 FL (ref 7.5–12.5)
POTASSIUM SERPL-SCNC: 4.1 MMOL/L (ref 3.5–5.3)
RBC # BLD AUTO: 4.87 MILLION/UL (ref 3.8–5.1)
SODIUM SERPL-SCNC: 143 MMOL/L (ref 135–146)
WBC # BLD AUTO: 3.7 THOUSAND/UL (ref 3.8–10.8)

## 2025-08-15 ENCOUNTER — HOSPITAL ENCOUNTER (OUTPATIENT)
Dept: CARDIOLOGY | Facility: HOSPITAL | Age: 75
Discharge: HOME | End: 2025-08-15
Payer: MEDICARE

## 2025-08-15 ENCOUNTER — ANESTHESIA EVENT (OUTPATIENT)
Dept: CARDIOLOGY | Facility: HOSPITAL | Age: 75
End: 2025-08-15
Payer: MEDICARE

## 2025-08-15 ENCOUNTER — ANESTHESIA (OUTPATIENT)
Dept: CARDIOLOGY | Facility: HOSPITAL | Age: 75
End: 2025-08-15
Payer: MEDICARE

## 2025-08-15 VITALS
OXYGEN SATURATION: 96 % | SYSTOLIC BLOOD PRESSURE: 158 MMHG | HEART RATE: 54 BPM | RESPIRATION RATE: 12 BRPM | DIASTOLIC BLOOD PRESSURE: 91 MMHG

## 2025-08-15 DIAGNOSIS — I48.0 PAROXYSMAL ATRIAL FIBRILLATION (MULTI): ICD-10-CM

## 2025-08-15 LAB — EJECTION FRACTION: 63 %

## 2025-08-15 PROCEDURE — 2500000005 HC RX 250 GENERAL PHARMACY W/O HCPCS: Performed by: INTERNAL MEDICINE

## 2025-08-15 PROCEDURE — 2500000004 HC RX 250 GENERAL PHARMACY W/ HCPCS (ALT 636 FOR OP/ED)

## 2025-08-15 PROCEDURE — 93320 DOPPLER ECHO COMPLETE: CPT | Performed by: INTERNAL MEDICINE

## 2025-08-15 PROCEDURE — 3700000001 HC GENERAL ANESTHESIA TIME - INITIAL BASE CHARGE

## 2025-08-15 PROCEDURE — 93312 ECHO TRANSESOPHAGEAL: CPT | Performed by: INTERNAL MEDICINE

## 2025-08-15 PROCEDURE — 93325 DOPPLER ECHO COLOR FLOW MAPG: CPT | Performed by: INTERNAL MEDICINE

## 2025-08-15 PROCEDURE — 99152 MOD SED SAME PHYS/QHP 5/>YRS: CPT | Performed by: INTERNAL MEDICINE

## 2025-08-15 PROCEDURE — 93320 DOPPLER ECHO COMPLETE: CPT

## 2025-08-15 PROCEDURE — 3700000002 HC GENERAL ANESTHESIA TIME - EACH INCREMENTAL 1 MINUTE

## 2025-08-15 RX ORDER — ALBUTEROL SULFATE 0.83 MG/ML
2.5 SOLUTION RESPIRATORY (INHALATION) ONCE AS NEEDED
OUTPATIENT
Start: 2025-08-15

## 2025-08-15 RX ORDER — DROPERIDOL 2.5 MG/ML
0.62 INJECTION, SOLUTION INTRAMUSCULAR; INTRAVENOUS ONCE AS NEEDED
OUTPATIENT
Start: 2025-08-15

## 2025-08-15 RX ORDER — LIDOCAINE HCL/PF 100 MG/5ML
SYRINGE (ML) INTRAVENOUS AS NEEDED
Status: DISCONTINUED | OUTPATIENT
Start: 2025-08-15 | End: 2025-08-15

## 2025-08-15 RX ORDER — ONDANSETRON HYDROCHLORIDE 2 MG/ML
4 INJECTION, SOLUTION INTRAVENOUS ONCE AS NEEDED
OUTPATIENT
Start: 2025-08-15

## 2025-08-15 RX ORDER — LIDOCAINE HYDROCHLORIDE 20 MG/ML
SOLUTION OROPHARYNGEAL AS NEEDED
Status: DISCONTINUED | OUTPATIENT
Start: 2025-08-15 | End: 2025-08-15 | Stop reason: HOSPADM

## 2025-08-15 RX ORDER — PROPOFOL 10 MG/ML
INJECTION, EMULSION INTRAVENOUS AS NEEDED
Status: DISCONTINUED | OUTPATIENT
Start: 2025-08-15 | End: 2025-08-15

## 2025-08-15 RX ORDER — SODIUM CHLORIDE, SODIUM LACTATE, POTASSIUM CHLORIDE, CALCIUM CHLORIDE 600; 310; 30; 20 MG/100ML; MG/100ML; MG/100ML; MG/100ML
50 INJECTION, SOLUTION INTRAVENOUS CONTINUOUS
OUTPATIENT
Start: 2025-08-15 | End: 2025-08-16

## 2025-08-15 RX ORDER — DIPHENHYDRAMINE HYDROCHLORIDE 50 MG/ML
INJECTION, SOLUTION INTRAMUSCULAR; INTRAVENOUS AS NEEDED
Status: DISCONTINUED | OUTPATIENT
Start: 2025-08-15 | End: 2025-08-15

## 2025-08-15 RX ORDER — LIDOCAINE HYDROCHLORIDE 10 MG/ML
0.1 INJECTION, SOLUTION EPIDURAL; INFILTRATION; INTRACAUDAL; PERINEURAL ONCE
OUTPATIENT
Start: 2025-08-15 | End: 2025-08-15

## 2025-08-15 RX ADMIN — PROPOFOL 20 MG: 10 INJECTION, EMULSION INTRAVENOUS at 13:25

## 2025-08-15 RX ADMIN — PROPOFOL 10 MG: 10 INJECTION, EMULSION INTRAVENOUS at 13:48

## 2025-08-15 RX ADMIN — DIPHENHYDRAMINE HYDROCHLORIDE 5 MG: 50 INJECTION, SOLUTION INTRAMUSCULAR; INTRAVENOUS at 13:25

## 2025-08-15 RX ADMIN — PROPOFOL 10 MG: 10 INJECTION, EMULSION INTRAVENOUS at 13:30

## 2025-08-15 RX ADMIN — LIDOCAINE HYDROCHLORIDE 15 ML: 20 SOLUTION ORAL at 13:19

## 2025-08-15 RX ADMIN — PROPOFOL 10 MG: 10 INJECTION, EMULSION INTRAVENOUS at 13:42

## 2025-08-15 RX ADMIN — PROPOFOL 40 MG: 10 INJECTION, EMULSION INTRAVENOUS at 13:23

## 2025-08-15 RX ADMIN — SODIUM CHLORIDE, POTASSIUM CHLORIDE, SODIUM LACTATE AND CALCIUM CHLORIDE: 600; 310; 30; 20 INJECTION, SOLUTION INTRAVENOUS at 13:18

## 2025-08-15 RX ADMIN — PROPOFOL 20 MG: 10 INJECTION, EMULSION INTRAVENOUS at 13:35

## 2025-08-15 RX ADMIN — PROPOFOL 10 MG: 10 INJECTION, EMULSION INTRAVENOUS at 13:38

## 2025-08-15 RX ADMIN — DIPHENHYDRAMINE HYDROCHLORIDE 10 MG: 50 INJECTION, SOLUTION INTRAMUSCULAR; INTRAVENOUS at 13:18

## 2025-08-15 RX ADMIN — LIDOCAINE HYDROCHLORIDE 80 MG: 20 INJECTION INTRAVENOUS at 13:23

## 2025-08-15 RX ADMIN — PROPOFOL 30 MG: 10 INJECTION, EMULSION INTRAVENOUS at 13:26

## 2025-08-15 RX ADMIN — PROPOFOL 10 MG: 10 INJECTION, EMULSION INTRAVENOUS at 13:45

## 2025-08-15 RX ADMIN — PROPOFOL 10 MG: 10 INJECTION, EMULSION INTRAVENOUS at 13:33

## 2025-08-15 RX ADMIN — PROPOFOL 10 MG: 10 INJECTION, EMULSION INTRAVENOUS at 13:28

## 2025-08-15 RX ADMIN — BENZOCAINE 3 SPRAY: 200 SPRAY DENTAL; ORAL; PERIODONTAL at 13:18

## 2025-08-15 SDOH — HEALTH STABILITY: MENTAL HEALTH: CURRENT SMOKER: 0

## 2025-08-21 DIAGNOSIS — I48.91 ATRIAL FIBRILLATION, UNSPECIFIED TYPE (MULTI): ICD-10-CM

## 2025-08-28 ENCOUNTER — APPOINTMENT (OUTPATIENT)
Dept: RADIOLOGY | Facility: HOSPITAL | Age: 75
End: 2025-08-28
Payer: MEDICARE

## 2025-09-02 ENCOUNTER — LAB (OUTPATIENT)
Dept: LAB | Facility: HOSPITAL | Age: 75
End: 2025-09-02
Payer: MEDICARE

## 2025-09-02 PROCEDURE — 86850 RBC ANTIBODY SCREEN: CPT

## 2025-09-02 PROCEDURE — 86901 BLOOD TYPING SEROLOGIC RH(D): CPT

## 2025-09-02 PROCEDURE — 86900 BLOOD TYPING SEROLOGIC ABO: CPT

## 2025-09-03 LAB
ABO GROUP (TYPE) IN BLOOD: NORMAL
ANION GAP SERPL CALCULATED.4IONS-SCNC: 9 MMOL/L (CALC) (ref 7–17)
ANTIBODY SCREEN: NORMAL
BUN SERPL-MCNC: 23 MG/DL (ref 7–25)
BUN/CREAT SERPL: ABNORMAL (CALC) (ref 6–22)
CALCIUM SERPL-MCNC: 9.5 MG/DL (ref 8.6–10.4)
CHLORIDE SERPL-SCNC: 104 MMOL/L (ref 98–110)
CO2 SERPL-SCNC: 28 MMOL/L (ref 20–32)
CREAT SERPL-MCNC: 0.81 MG/DL (ref 0.6–1)
EGFRCR SERPLBLD CKD-EPI 2021: 75 ML/MIN/1.73M2
ERYTHROCYTE [DISTWIDTH] IN BLOOD BY AUTOMATED COUNT: 13 % (ref 11–15)
GLUCOSE SERPL-MCNC: 128 MG/DL (ref 65–99)
HCT VFR BLD AUTO: 46.5 % (ref 35–45)
HGB BLD-MCNC: 15.2 G/DL (ref 11.7–15.5)
MCH RBC QN AUTO: 30 PG (ref 27–33)
MCHC RBC AUTO-ENTMCNC: 32.7 G/DL (ref 32–36)
MCV RBC AUTO: 91.7 FL (ref 80–100)
PLATELET # BLD AUTO: 221 THOUSAND/UL (ref 140–400)
PMV BLD REES-ECKER: 9.4 FL (ref 7.5–12.5)
POTASSIUM SERPL-SCNC: 4.2 MMOL/L (ref 3.5–5.3)
RBC # BLD AUTO: 5.07 MILLION/UL (ref 3.8–5.1)
RH FACTOR (ANTIGEN D): NORMAL
SODIUM SERPL-SCNC: 141 MMOL/L (ref 135–146)
WBC # BLD AUTO: 5.4 THOUSAND/UL (ref 3.8–10.8)

## 2025-09-04 ENCOUNTER — APPOINTMENT (OUTPATIENT)
Dept: CARDIOLOGY | Facility: HOSPITAL | Age: 75
End: 2025-09-04
Payer: MEDICARE

## 2025-09-04 ENCOUNTER — HOSPITAL ENCOUNTER (INPATIENT)
Facility: HOSPITAL | Age: 75
LOS: 1 days | Discharge: HOME | End: 2025-09-05
Attending: INTERNAL MEDICINE | Admitting: INTERNAL MEDICINE
Payer: MEDICARE

## 2025-09-04 ENCOUNTER — ANESTHESIA EVENT (OUTPATIENT)
Dept: CARDIOLOGY | Facility: HOSPITAL | Age: 75
End: 2025-09-04
Payer: MEDICARE

## 2025-09-04 ENCOUNTER — ANESTHESIA (OUTPATIENT)
Dept: CARDIOLOGY | Facility: HOSPITAL | Age: 75
End: 2025-09-04
Payer: MEDICARE

## 2025-09-04 DIAGNOSIS — R26.89 OTHER ABNORMALITIES OF GAIT AND MOBILITY: ICD-10-CM

## 2025-09-04 DIAGNOSIS — Z79.01 LONG TERM (CURRENT) USE OF ANTICOAGULANTS: ICD-10-CM

## 2025-09-04 DIAGNOSIS — T45.515A ADVERSE EFFECT OF ANTICOAGULANTS, INITIAL ENCOUNTER: ICD-10-CM

## 2025-09-04 DIAGNOSIS — I48.0 PAROXYSMAL ATRIAL FIBRILLATION (MULTI): Primary | ICD-10-CM

## 2025-09-04 LAB
ACT BLD: 354 SEC (ref 82–174)
ACT BLD: 355 SEC (ref 82–174)

## 2025-09-04 PROCEDURE — 2500000002 HC RX 250 W HCPCS SELF ADMINISTERED DRUGS (ALT 637 FOR MEDICARE OP, ALT 636 FOR OP/ED): Performed by: INTERNAL MEDICINE

## 2025-09-04 PROCEDURE — C1760 CLOSURE DEV, VASC: HCPCS | Performed by: INTERNAL MEDICINE

## 2025-09-04 PROCEDURE — 7100000001 HC RECOVERY ROOM TIME - INITIAL BASE CHARGE: Performed by: INTERNAL MEDICINE

## 2025-09-04 PROCEDURE — 3700000001 HC GENERAL ANESTHESIA TIME - INITIAL BASE CHARGE: Performed by: INTERNAL MEDICINE

## 2025-09-04 PROCEDURE — C1766 INTRO/SHEATH,STRBLE,NON-PEEL: HCPCS | Performed by: INTERNAL MEDICINE

## 2025-09-04 PROCEDURE — 7100000002 HC RECOVERY ROOM TIME - EACH INCREMENTAL 1 MINUTE: Performed by: INTERNAL MEDICINE

## 2025-09-04 PROCEDURE — 2500000004 HC RX 250 GENERAL PHARMACY W/ HCPCS (ALT 636 FOR OP/ED): Performed by: NURSE ANESTHETIST, CERTIFIED REGISTERED

## 2025-09-04 PROCEDURE — 85347 COAGULATION TIME ACTIVATED: CPT

## 2025-09-04 PROCEDURE — C1889 IMPLANT/INSERT DEVICE, NOC: HCPCS | Performed by: INTERNAL MEDICINE

## 2025-09-04 PROCEDURE — 76937 US GUIDE VASCULAR ACCESS: CPT | Performed by: INTERNAL MEDICINE

## 2025-09-04 PROCEDURE — C1894 INTRO/SHEATH, NON-LASER: HCPCS | Performed by: INTERNAL MEDICINE

## 2025-09-04 PROCEDURE — 93657 TX L/R ATRIAL FIB ADDL: CPT | Performed by: INTERNAL MEDICINE

## 2025-09-04 PROCEDURE — 85347 COAGULATION TIME ACTIVATED: CPT | Performed by: INTERNAL MEDICINE

## 2025-09-04 PROCEDURE — 93656 COMPRE EP EVAL ABLTJ ATR FIB: CPT | Performed by: INTERNAL MEDICINE

## 2025-09-04 PROCEDURE — 1200000002 HC GENERAL ROOM WITH TELEMETRY DAILY

## 2025-09-04 PROCEDURE — 2720000007 HC OR 272 NO HCPCS: Performed by: INTERNAL MEDICINE

## 2025-09-04 PROCEDURE — 33340 PERQ CLSR TCAT L ATR APNDGE: CPT | Performed by: INTERNAL MEDICINE

## 2025-09-04 PROCEDURE — C1733 CATH, EP, OTHR THAN COOL-TIP: HCPCS | Performed by: INTERNAL MEDICINE

## 2025-09-04 PROCEDURE — C1893 INTRO/SHEATH, FIXED,NON-PEEL: HCPCS | Performed by: INTERNAL MEDICINE

## 2025-09-04 PROCEDURE — 3700000002 HC GENERAL ANESTHESIA TIME - EACH INCREMENTAL 1 MINUTE: Performed by: INTERNAL MEDICINE

## 2025-09-04 PROCEDURE — C1731 CATH, EP, 20 OR MORE ELEC: HCPCS | Performed by: INTERNAL MEDICINE

## 2025-09-04 PROCEDURE — 2500000001 HC RX 250 WO HCPCS SELF ADMINISTERED DRUGS (ALT 637 FOR MEDICARE OP): Performed by: INTERNAL MEDICINE

## 2025-09-04 PROCEDURE — 2780000003 HC OR 278 NO HCPCS: Performed by: INTERNAL MEDICINE

## 2025-09-04 PROCEDURE — 36620 INSERTION CATHETER ARTERY: CPT | Performed by: NURSE ANESTHETIST, CERTIFIED REGISTERED

## 2025-09-04 PROCEDURE — 36415 COLL VENOUS BLD VENIPUNCTURE: CPT | Performed by: INTERNAL MEDICINE

## 2025-09-04 PROCEDURE — 93662 INTRACARDIAC ECG (ICE): CPT | Performed by: INTERNAL MEDICINE

## 2025-09-04 PROCEDURE — 93005 ELECTROCARDIOGRAM TRACING: CPT

## 2025-09-04 PROCEDURE — C1759 CATH, INTRA ECHOCARDIOGRAPHY: HCPCS | Performed by: INTERNAL MEDICINE

## 2025-09-04 PROCEDURE — G0269 OCCLUSIVE DEVICE IN VEIN ART: HCPCS | Performed by: INTERNAL MEDICINE

## 2025-09-04 PROCEDURE — 2500000004 HC RX 250 GENERAL PHARMACY W/ HCPCS (ALT 636 FOR OP/ED): Performed by: INTERNAL MEDICINE

## 2025-09-04 DEVICE — IMPLANTABLE DEVICE: Type: IMPLANTABLE DEVICE | Site: HEART | Status: FUNCTIONAL

## 2025-09-04 RX ORDER — CEFAZOLIN 1 G/1
INJECTION, POWDER, FOR SOLUTION INTRAVENOUS AS NEEDED
Status: DISCONTINUED | OUTPATIENT
Start: 2025-09-04 | End: 2025-09-04

## 2025-09-04 RX ORDER — FAMOTIDINE 20 MG/1
20 TABLET, FILM COATED ORAL DAILY PRN
Status: DISCONTINUED | OUTPATIENT
Start: 2025-09-04 | End: 2025-09-05 | Stop reason: HOSPADM

## 2025-09-04 RX ORDER — CETIRIZINE HYDROCHLORIDE 10 MG/1
10 TABLET ORAL EVERY 24 HOURS
Status: DISCONTINUED | OUTPATIENT
Start: 2025-09-05 | End: 2025-09-05 | Stop reason: HOSPADM

## 2025-09-04 RX ORDER — ONDANSETRON HYDROCHLORIDE 2 MG/ML
INJECTION, SOLUTION INTRAVENOUS AS NEEDED
Status: DISCONTINUED | OUTPATIENT
Start: 2025-09-04 | End: 2025-09-04

## 2025-09-04 RX ORDER — THYROID 60 MG/1
60 TABLET ORAL DAILY
Status: DISCONTINUED | OUTPATIENT
Start: 2025-09-05 | End: 2025-09-05 | Stop reason: HOSPADM

## 2025-09-04 RX ORDER — FLUTICASONE PROPIONATE 50 MCG
2 SPRAY, SUSPENSION (ML) NASAL DAILY
Status: DISCONTINUED | OUTPATIENT
Start: 2025-09-05 | End: 2025-09-05 | Stop reason: HOSPADM

## 2025-09-04 RX ORDER — PROCHLORPERAZINE 25 MG/1
25 SUPPOSITORY RECTAL EVERY 12 HOURS PRN
Status: DISCONTINUED | OUTPATIENT
Start: 2025-09-04 | End: 2025-09-05 | Stop reason: HOSPADM

## 2025-09-04 RX ORDER — PROTAMINE SULFATE 10 MG/ML
INJECTION, SOLUTION INTRAVENOUS AS NEEDED
Status: DISCONTINUED | OUTPATIENT
Start: 2025-09-04 | End: 2025-09-04

## 2025-09-04 RX ORDER — TALC
3 POWDER (GRAM) TOPICAL NIGHTLY PRN
Status: DISCONTINUED | OUTPATIENT
Start: 2025-09-04 | End: 2025-09-05 | Stop reason: HOSPADM

## 2025-09-04 RX ORDER — FAMOTIDINE 10 MG/ML
INJECTION, SOLUTION INTRAVENOUS AS NEEDED
Status: DISCONTINUED | OUTPATIENT
Start: 2025-09-04 | End: 2025-09-04

## 2025-09-04 RX ORDER — ATROPINE SULFATE 0.1 MG/ML
INJECTION INTRAVENOUS AS NEEDED
Status: DISCONTINUED | OUTPATIENT
Start: 2025-09-04 | End: 2025-09-04

## 2025-09-04 RX ORDER — CARVEDILOL 6.25 MG/1
6.25 TABLET ORAL 2 TIMES DAILY
Status: DISCONTINUED | OUTPATIENT
Start: 2025-09-04 | End: 2025-09-05 | Stop reason: HOSPADM

## 2025-09-04 RX ORDER — PHENYLEPHRINE HCL IN 0.9% NACL 0.4MG/10ML
SYRINGE (ML) INTRAVENOUS AS NEEDED
Status: DISCONTINUED | OUTPATIENT
Start: 2025-09-04 | End: 2025-09-04

## 2025-09-04 RX ORDER — PROCHLORPERAZINE MALEATE 10 MG
10 TABLET ORAL EVERY 6 HOURS PRN
Status: DISCONTINUED | OUTPATIENT
Start: 2025-09-04 | End: 2025-09-05 | Stop reason: HOSPADM

## 2025-09-04 RX ORDER — CLONAZEPAM 0.5 MG/1
0.25 TABLET ORAL NIGHTLY
Status: DISCONTINUED | OUTPATIENT
Start: 2025-09-04 | End: 2025-09-05 | Stop reason: HOSPADM

## 2025-09-04 RX ORDER — DICYCLOMINE HYDROCHLORIDE 10 MG/1
10 CAPSULE ORAL 3 TIMES DAILY
Status: DISCONTINUED | OUTPATIENT
Start: 2025-09-04 | End: 2025-09-05 | Stop reason: HOSPADM

## 2025-09-04 RX ORDER — BUPROPION HYDROCHLORIDE 100 MG/1
100 TABLET ORAL DAILY
Status: DISCONTINUED | OUTPATIENT
Start: 2025-09-05 | End: 2025-09-05 | Stop reason: HOSPADM

## 2025-09-04 RX ORDER — PROCHLORPERAZINE EDISYLATE 5 MG/ML
10 INJECTION INTRAMUSCULAR; INTRAVENOUS EVERY 6 HOURS PRN
Status: DISCONTINUED | OUTPATIENT
Start: 2025-09-04 | End: 2025-09-05 | Stop reason: HOSPADM

## 2025-09-04 RX ORDER — ROCURONIUM BROMIDE 10 MG/ML
INJECTION, SOLUTION INTRAVENOUS AS NEEDED
Status: DISCONTINUED | OUTPATIENT
Start: 2025-09-04 | End: 2025-09-04

## 2025-09-04 RX ORDER — AZELASTINE 1 MG/ML
1 SPRAY, METERED NASAL 2 TIMES DAILY
Status: DISCONTINUED | OUTPATIENT
Start: 2025-09-04 | End: 2025-09-05 | Stop reason: HOSPADM

## 2025-09-04 RX ORDER — DESVENLAFAXINE 50 MG/1
50 TABLET, EXTENDED RELEASE ORAL DAILY
Status: DISCONTINUED | OUTPATIENT
Start: 2025-09-05 | End: 2025-09-05 | Stop reason: HOSPADM

## 2025-09-04 RX ORDER — HEPARIN SODIUM 1000 [USP'U]/ML
INJECTION, SOLUTION INTRAVENOUS; SUBCUTANEOUS AS NEEDED
Status: DISCONTINUED | OUTPATIENT
Start: 2025-09-04 | End: 2025-09-04 | Stop reason: HOSPADM

## 2025-09-04 RX ORDER — ALBUTEROL SULFATE 90 UG/1
2 INHALANT RESPIRATORY (INHALATION) EVERY 4 HOURS PRN
Status: DISCONTINUED | OUTPATIENT
Start: 2025-09-04 | End: 2025-09-05 | Stop reason: HOSPADM

## 2025-09-04 RX ORDER — PROPOFOL 10 MG/ML
INJECTION, EMULSION INTRAVENOUS AS NEEDED
Status: DISCONTINUED | OUTPATIENT
Start: 2025-09-04 | End: 2025-09-04

## 2025-09-04 RX ORDER — EZETIMIBE 10 MG/1
10 TABLET ORAL DAILY
Status: DISCONTINUED | OUTPATIENT
Start: 2025-09-05 | End: 2025-09-05 | Stop reason: HOSPADM

## 2025-09-04 RX ORDER — SUMATRIPTAN SUCCINATE 50 MG/1
50 TABLET ORAL ONCE AS NEEDED
Status: DISCONTINUED | OUTPATIENT
Start: 2025-09-04 | End: 2025-09-05 | Stop reason: HOSPADM

## 2025-09-04 RX ORDER — DIPHENHYDRAMINE HYDROCHLORIDE 50 MG/ML
INJECTION, SOLUTION INTRAMUSCULAR; INTRAVENOUS AS NEEDED
Status: DISCONTINUED | OUTPATIENT
Start: 2025-09-04 | End: 2025-09-04

## 2025-09-04 RX ORDER — LABETALOL HYDROCHLORIDE 5 MG/ML
INJECTION, SOLUTION INTRAVENOUS AS NEEDED
Status: DISCONTINUED | OUTPATIENT
Start: 2025-09-04 | End: 2025-09-04

## 2025-09-04 RX ORDER — FENTANYL CITRATE 50 UG/ML
INJECTION, SOLUTION INTRAMUSCULAR; INTRAVENOUS AS NEEDED
Status: DISCONTINUED | OUTPATIENT
Start: 2025-09-04 | End: 2025-09-04

## 2025-09-04 RX ORDER — MIDAZOLAM HYDROCHLORIDE 2 MG/2ML
INJECTION, SOLUTION INTRAMUSCULAR; INTRAVENOUS AS NEEDED
Status: DISCONTINUED | OUTPATIENT
Start: 2025-09-04 | End: 2025-09-04

## 2025-09-04 RX ORDER — LIDOCAINE HYDROCHLORIDE 20 MG/ML
INJECTION, SOLUTION INFILTRATION; PERINEURAL AS NEEDED
Status: DISCONTINUED | OUTPATIENT
Start: 2025-09-04 | End: 2025-09-04

## 2025-09-04 RX ADMIN — LIDOCAINE HYDROCHLORIDE 60 MG: 20 INJECTION, SOLUTION INFILTRATION; PERINEURAL at 12:22

## 2025-09-04 RX ADMIN — PROTAMINE SULFATE 40 MG: 10 INJECTION, SOLUTION INTRAVENOUS at 14:53

## 2025-09-04 RX ADMIN — PROPOFOL 50 MG: 10 INJECTION, EMULSION INTRAVENOUS at 13:52

## 2025-09-04 RX ADMIN — DIPHENHYDRAMINE HYDROCHLORIDE 25 MG: 50 INJECTION INTRAMUSCULAR; INTRAVENOUS at 12:53

## 2025-09-04 RX ADMIN — CEFAZOLIN 2 G: 1 INJECTION, POWDER, FOR SOLUTION INTRAMUSCULAR; INTRAVENOUS at 12:40

## 2025-09-04 RX ADMIN — Medication 80 MCG: at 13:24

## 2025-09-04 RX ADMIN — RIVAROXABAN 20 MG: 20 TABLET, FILM COATED ORAL at 21:26

## 2025-09-04 RX ADMIN — PROPOFOL 100 MG: 10 INJECTION, EMULSION INTRAVENOUS at 12:22

## 2025-09-04 RX ADMIN — DEXAMETHASONE SODIUM PHOSPHATE 4 MG: 4 INJECTION INTRA-ARTICULAR; INTRALESIONAL; INTRAMUSCULAR; INTRAVENOUS; SOFT TISSUE at 12:45

## 2025-09-04 RX ADMIN — CARVEDILOL 6.25 MG: 6.25 TABLET, FILM COATED ORAL at 21:21

## 2025-09-04 RX ADMIN — LABETALOL HYDROCHLORIDE 10 MG: 5 INJECTION, SOLUTION INTRAVENOUS at 15:02

## 2025-09-04 RX ADMIN — SUGAMMADEX 200 MG: 100 INJECTION, SOLUTION INTRAVENOUS at 15:00

## 2025-09-04 RX ADMIN — SUGAMMADEX 200 MG: 100 INJECTION, SOLUTION INTRAVENOUS at 15:04

## 2025-09-04 RX ADMIN — MIDAZOLAM HYDROCHLORIDE 1 MG: 2 INJECTION, SOLUTION INTRAMUSCULAR; INTRAVENOUS at 12:18

## 2025-09-04 RX ADMIN — DICYCLOMINE HYDROCHLORIDE 10 MG: 10 CAPSULE ORAL at 21:22

## 2025-09-04 RX ADMIN — SODIUM CHLORIDE, SODIUM LACTATE, POTASSIUM CHLORIDE, AND CALCIUM CHLORIDE: 600; 310; 30; 20 INJECTION, SOLUTION INTRAVENOUS at 11:58

## 2025-09-04 RX ADMIN — CLONAZEPAM 0.25 MG: 0.5 TABLET ORAL at 21:21

## 2025-09-04 RX ADMIN — ATROPINE SULFATE 0.5 MG: 0.1 INJECTION INTRAVENOUS at 13:50

## 2025-09-04 RX ADMIN — FAMOTIDINE 20 MG: 10 INJECTION INTRAVENOUS at 12:45

## 2025-09-04 RX ADMIN — ROCURONIUM BROMIDE 100 MG: 10 INJECTION INTRAVENOUS at 12:22

## 2025-09-04 RX ADMIN — FENTANYL CITRATE 100 MCG: 50 INJECTION, SOLUTION INTRAMUSCULAR; INTRAVENOUS at 12:22

## 2025-09-04 RX ADMIN — ONDANSETRON 4 MG: 2 INJECTION INTRAMUSCULAR; INTRAVENOUS at 14:53

## 2025-09-04 RX ADMIN — PROPOFOL 50 MG: 10 INJECTION, EMULSION INTRAVENOUS at 13:10

## 2025-09-04 RX ADMIN — AZELASTINE HYDROCHLORIDE 1 SPRAY: 137 SPRAY, METERED NASAL at 21:21

## 2025-09-04 RX ADMIN — ROCURONIUM BROMIDE 20 MG: 10 INJECTION INTRAVENOUS at 13:52

## 2025-09-04 SDOH — ECONOMIC STABILITY: INCOME INSECURITY: IN THE PAST 12 MONTHS HAS THE ELECTRIC, GAS, OIL, OR WATER COMPANY THREATENED TO SHUT OFF SERVICES IN YOUR HOME?: NO

## 2025-09-04 SDOH — ECONOMIC STABILITY: FOOD INSECURITY: WITHIN THE PAST 12 MONTHS, THE FOOD YOU BOUGHT JUST DIDN'T LAST AND YOU DIDN'T HAVE MONEY TO GET MORE.: NEVER TRUE

## 2025-09-04 SDOH — HEALTH STABILITY: MENTAL HEALTH: HOW OFTEN DO YOU HAVE A DRINK CONTAINING ALCOHOL?: NEVER

## 2025-09-04 SDOH — ECONOMIC STABILITY: HOUSING INSECURITY: IN THE PAST 12 MONTHS, HOW MANY TIMES HAVE YOU MOVED WHERE YOU WERE LIVING?: 0

## 2025-09-04 SDOH — ECONOMIC STABILITY: HOUSING INSECURITY: IN THE LAST 12 MONTHS, WAS THERE A TIME WHEN YOU WERE NOT ABLE TO PAY THE MORTGAGE OR RENT ON TIME?: NO

## 2025-09-04 SDOH — HEALTH STABILITY: MENTAL HEALTH: HOW OFTEN DO YOU HAVE SIX OR MORE DRINKS ON ONE OCCASION?: NEVER

## 2025-09-04 SDOH — SOCIAL STABILITY: SOCIAL INSECURITY: WERE YOU ABLE TO COMPLETE ALL THE BEHAVIORAL HEALTH SCREENINGS?: YES

## 2025-09-04 SDOH — SOCIAL STABILITY: SOCIAL INSECURITY: ARE THERE ANY APPARENT SIGNS OF INJURIES/BEHAVIORS THAT COULD BE RELATED TO ABUSE/NEGLECT?: NO

## 2025-09-04 SDOH — SOCIAL STABILITY: SOCIAL INSECURITY: DOES ANYONE TRY TO KEEP YOU FROM HAVING/CONTACTING OTHER FRIENDS OR DOING THINGS OUTSIDE YOUR HOME?: NO

## 2025-09-04 SDOH — SOCIAL STABILITY: SOCIAL INSECURITY: ABUSE: ADULT

## 2025-09-04 SDOH — HEALTH STABILITY: MENTAL HEALTH: HOW MANY DRINKS CONTAINING ALCOHOL DO YOU HAVE ON A TYPICAL DAY WHEN YOU ARE DRINKING?: PATIENT DOES NOT DRINK

## 2025-09-04 SDOH — SOCIAL STABILITY: SOCIAL INSECURITY: WITHIN THE LAST YEAR, HAVE YOU BEEN AFRAID OF YOUR PARTNER OR EX-PARTNER?: NO

## 2025-09-04 SDOH — ECONOMIC STABILITY: HOUSING INSECURITY: AT ANY TIME IN THE PAST 12 MONTHS, WERE YOU HOMELESS OR LIVING IN A SHELTER (INCLUDING NOW)?: NO

## 2025-09-04 SDOH — SOCIAL STABILITY: SOCIAL INSECURITY: HAVE YOU HAD THOUGHTS OF HARMING ANYONE ELSE?: NO

## 2025-09-04 SDOH — ECONOMIC STABILITY: FOOD INSECURITY: HOW HARD IS IT FOR YOU TO PAY FOR THE VERY BASICS LIKE FOOD, HOUSING, MEDICAL CARE, AND HEATING?: NOT VERY HARD

## 2025-09-04 SDOH — SOCIAL STABILITY: SOCIAL INSECURITY
WITHIN THE LAST YEAR, HAVE YOU BEEN RAPED OR FORCED TO HAVE ANY KIND OF SEXUAL ACTIVITY BY YOUR PARTNER OR EX-PARTNER?: NO

## 2025-09-04 SDOH — SOCIAL STABILITY: SOCIAL INSECURITY: DO YOU FEEL UNSAFE GOING BACK TO THE PLACE WHERE YOU ARE LIVING?: NO

## 2025-09-04 SDOH — SOCIAL STABILITY: SOCIAL INSECURITY: DO YOU FEEL ANYONE HAS EXPLOITED OR TAKEN ADVANTAGE OF YOU FINANCIALLY OR OF YOUR PERSONAL PROPERTY?: NO

## 2025-09-04 SDOH — ECONOMIC STABILITY: FOOD INSECURITY: WITHIN THE PAST 12 MONTHS, YOU WORRIED THAT YOUR FOOD WOULD RUN OUT BEFORE YOU GOT THE MONEY TO BUY MORE.: NEVER TRUE

## 2025-09-04 SDOH — SOCIAL STABILITY: SOCIAL INSECURITY
WITHIN THE LAST YEAR, HAVE YOU BEEN KICKED, HIT, SLAPPED, OR OTHERWISE PHYSICALLY HURT BY YOUR PARTNER OR EX-PARTNER?: NO

## 2025-09-04 SDOH — SOCIAL STABILITY: SOCIAL INSECURITY: HAS ANYONE EVER THREATENED TO HURT YOUR FAMILY OR YOUR PETS?: NO

## 2025-09-04 SDOH — SOCIAL STABILITY: SOCIAL INSECURITY: HAVE YOU HAD ANY THOUGHTS OF HARMING ANYONE ELSE?: NO

## 2025-09-04 SDOH — SOCIAL STABILITY: SOCIAL INSECURITY: WITHIN THE LAST YEAR, HAVE YOU BEEN HUMILIATED OR EMOTIONALLY ABUSED IN OTHER WAYS BY YOUR PARTNER OR EX-PARTNER?: NO

## 2025-09-04 SDOH — SOCIAL STABILITY: SOCIAL INSECURITY: ARE YOU OR HAVE YOU BEEN THREATENED OR ABUSED PHYSICALLY, EMOTIONALLY, OR SEXUALLY BY ANYONE?: NO

## 2025-09-04 SDOH — ECONOMIC STABILITY: TRANSPORTATION INSECURITY: IN THE PAST 12 MONTHS, HAS LACK OF TRANSPORTATION KEPT YOU FROM MEDICAL APPOINTMENTS OR FROM GETTING MEDICATIONS?: NO

## 2025-09-04 ASSESSMENT — LIFESTYLE VARIABLES
SKIP TO QUESTIONS 9-10: 1
HOW OFTEN DO YOU HAVE A DRINK CONTAINING ALCOHOL: NEVER
HOW MANY STANDARD DRINKS CONTAINING ALCOHOL DO YOU HAVE ON A TYPICAL DAY: PATIENT DOES NOT DRINK
SKIP TO QUESTIONS 9-10: 1
AUDIT-C TOTAL SCORE: 0
HOW OFTEN DO YOU HAVE A DRINK CONTAINING ALCOHOL: NEVER
HOW OFTEN DO YOU HAVE 6 OR MORE DRINKS ON ONE OCCASION: NEVER
SKIP TO QUESTIONS 9-10: 1
AUDIT-C TOTAL SCORE: 0
HOW MANY STANDARD DRINKS CONTAINING ALCOHOL DO YOU HAVE ON A TYPICAL DAY: PATIENT DOES NOT DRINK
HOW OFTEN DO YOU HAVE 6 OR MORE DRINKS ON ONE OCCASION: NEVER

## 2025-09-04 ASSESSMENT — COGNITIVE AND FUNCTIONAL STATUS - GENERAL
CLIMB 3 TO 5 STEPS WITH RAILING: A LITTLE
DRESSING REGULAR LOWER BODY CLOTHING: A LITTLE
TOILETING: A LITTLE
PERSONAL GROOMING: A LITTLE
PERSONAL GROOMING: A LITTLE
WALKING IN HOSPITAL ROOM: A LITTLE
DRESSING REGULAR UPPER BODY CLOTHING: A LITTLE
WALKING IN HOSPITAL ROOM: A LITTLE
STANDING UP FROM CHAIR USING ARMS: A LITTLE
MOVING TO AND FROM BED TO CHAIR: A LITTLE
DRESSING REGULAR UPPER BODY CLOTHING: A LITTLE
MOBILITY SCORE: 19
HELP NEEDED FOR BATHING: A LITTLE
MOBILITY SCORE: 20
DAILY ACTIVITIY SCORE: 18
MOVING TO AND FROM BED TO CHAIR: A LITTLE
TURNING FROM BACK TO SIDE WHILE IN FLAT BAD: A LITTLE
PERSONAL GROOMING: A LITTLE
DAILY ACTIVITIY SCORE: 18
STANDING UP FROM CHAIR USING ARMS: A LITTLE
EATING MEALS: A LITTLE
TOILETING: A LITTLE
WALKING IN HOSPITAL ROOM: A LITTLE
EATING MEALS: A LITTLE
EATING MEALS: A LITTLE
DAILY ACTIVITIY SCORE: 18
DRESSING REGULAR UPPER BODY CLOTHING: A LITTLE
CLIMB 3 TO 5 STEPS WITH RAILING: A LITTLE
CLIMB 3 TO 5 STEPS WITH RAILING: A LITTLE
PATIENT BASELINE BEDBOUND: NO
HELP NEEDED FOR BATHING: A LITTLE
HELP NEEDED FOR BATHING: A LITTLE
MOBILITY SCORE: 20
STANDING UP FROM CHAIR USING ARMS: A LITTLE
MOVING TO AND FROM BED TO CHAIR: A LITTLE
TOILETING: A LITTLE

## 2025-09-04 ASSESSMENT — PATIENT HEALTH QUESTIONNAIRE - PHQ9
2. FEELING DOWN, DEPRESSED OR HOPELESS: NOT AT ALL
SUM OF ALL RESPONSES TO PHQ9 QUESTIONS 1 & 2: 0
1. LITTLE INTEREST OR PLEASURE IN DOING THINGS: NOT AT ALL
1. LITTLE INTEREST OR PLEASURE IN DOING THINGS: NOT AT ALL
SUM OF ALL RESPONSES TO PHQ9 QUESTIONS 1 & 2: 0
2. FEELING DOWN, DEPRESSED OR HOPELESS: NOT AT ALL

## 2025-09-04 ASSESSMENT — PAIN SCALES - GENERAL
PAINLEVEL_OUTOF10: 0 - NO PAIN
PAINLEVEL_OUTOF10: 0 - NO PAIN

## 2025-09-04 ASSESSMENT — ACTIVITIES OF DAILY LIVING (ADL)
HEARING - RIGHT EAR: FUNCTIONAL
PATIENT'S MEMORY ADEQUATE TO SAFELY COMPLETE DAILY ACTIVITIES?: YES
DRESSING YOURSELF: INDEPENDENT
TOILETING: INDEPENDENT
GROOMING: INDEPENDENT
JUDGMENT_ADEQUATE_SAFELY_COMPLETE_DAILY_ACTIVITIES: YES
WALKS IN HOME: NEEDS ASSISTANCE
HEARING - LEFT EAR: FUNCTIONAL
ADEQUATE_TO_COMPLETE_ADL: YES
LACK_OF_TRANSPORTATION: NO
BATHING: INDEPENDENT
FEEDING YOURSELF: INDEPENDENT
LACK_OF_TRANSPORTATION: NO

## 2025-09-04 ASSESSMENT — PAIN - FUNCTIONAL ASSESSMENT
PAIN_FUNCTIONAL_ASSESSMENT: 0-10
PAIN_FUNCTIONAL_ASSESSMENT: 0-10

## 2025-09-05 VITALS
TEMPERATURE: 98.2 F | DIASTOLIC BLOOD PRESSURE: 62 MMHG | HEIGHT: 64 IN | RESPIRATION RATE: 20 BRPM | HEART RATE: 68 BPM | WEIGHT: 226.19 LBS | BODY MASS INDEX: 38.62 KG/M2 | OXYGEN SATURATION: 96 % | SYSTOLIC BLOOD PRESSURE: 100 MMHG

## 2025-09-05 PROCEDURE — 2500000002 HC RX 250 W HCPCS SELF ADMINISTERED DRUGS (ALT 637 FOR MEDICARE OP, ALT 636 FOR OP/ED): Performed by: INTERNAL MEDICINE

## 2025-09-05 PROCEDURE — 99239 HOSP IP/OBS DSCHRG MGMT >30: CPT | Performed by: PHYSICIAN ASSISTANT

## 2025-09-05 PROCEDURE — 2500000001 HC RX 250 WO HCPCS SELF ADMINISTERED DRUGS (ALT 637 FOR MEDICARE OP): Performed by: INTERNAL MEDICINE

## 2025-09-05 RX ADMIN — LEVOTHYROXINE, LIOTHYRONINE 60 MG: 38; 9 TABLET ORAL at 09:16

## 2025-09-05 RX ADMIN — CARVEDILOL 6.25 MG: 6.25 TABLET, FILM COATED ORAL at 09:17

## 2025-09-05 RX ADMIN — EZETIMIBE 10 MG: 10 TABLET ORAL at 09:16

## 2025-09-05 RX ADMIN — BUPROPION HYDROCHLORIDE 100 MG: 100 TABLET, FILM COATED ORAL at 09:17

## 2025-09-05 RX ADMIN — DESVENLAFAXINE 50 MG: 50 TABLET, FILM COATED, EXTENDED RELEASE ORAL at 09:16

## 2025-09-05 RX ADMIN — AZELASTINE HYDROCHLORIDE 1 SPRAY: 137 SPRAY, METERED NASAL at 09:16

## 2025-09-05 RX ADMIN — CETIRIZINE HYDROCHLORIDE 10 MG: 10 TABLET, FILM COATED ORAL at 09:17

## 2025-09-05 RX ADMIN — DICYCLOMINE HYDROCHLORIDE 10 MG: 10 CAPSULE ORAL at 09:16

## 2025-09-05 ASSESSMENT — COGNITIVE AND FUNCTIONAL STATUS - GENERAL
CLIMB 3 TO 5 STEPS WITH RAILING: A LITTLE
MOVING TO AND FROM BED TO CHAIR: A LITTLE
TOILETING: A LITTLE
DRESSING REGULAR LOWER BODY CLOTHING: A LITTLE
DAILY ACTIVITIY SCORE: 18
WALKING IN HOSPITAL ROOM: A LITTLE
EATING MEALS: A LITTLE
HELP NEEDED FOR BATHING: A LITTLE
PERSONAL GROOMING: A LITTLE
STANDING UP FROM CHAIR USING ARMS: A LITTLE
DRESSING REGULAR UPPER BODY CLOTHING: A LITTLE
TURNING FROM BACK TO SIDE WHILE IN FLAT BAD: A LITTLE
MOBILITY SCORE: 19

## 2025-09-05 ASSESSMENT — ACTIVITIES OF DAILY LIVING (ADL): LACK_OF_TRANSPORTATION: NO

## 2025-09-05 ASSESSMENT — PAIN - FUNCTIONAL ASSESSMENT
PAIN_FUNCTIONAL_ASSESSMENT: 0-10
PAIN_FUNCTIONAL_ASSESSMENT: 0-10

## 2025-09-05 ASSESSMENT — PAIN SCALES - GENERAL
PAINLEVEL_OUTOF10: 0 - NO PAIN
PAINLEVEL_OUTOF10: 0 - NO PAIN

## 2025-09-18 ENCOUNTER — APPOINTMENT (OUTPATIENT)
Dept: PRIMARY CARE | Facility: CLINIC | Age: 75
End: 2025-09-18
Payer: MEDICARE

## 2025-12-04 ENCOUNTER — APPOINTMENT (OUTPATIENT)
Dept: RADIOLOGY | Facility: HOSPITAL | Age: 75
End: 2025-12-04
Payer: MEDICARE

## 2025-12-08 ENCOUNTER — APPOINTMENT (OUTPATIENT)
Dept: CARDIOLOGY | Facility: HOSPITAL | Age: 75
End: 2025-12-08
Payer: MEDICARE

## (undated) DEVICE — INTRODUCER, HEMOSTASIS, STR/J .038 IN, 8.5FR 12CM

## (undated) DEVICE — CABLE, CONNECTOR, DAIG RESPONSE, 210CM, BLACK

## (undated) DEVICE — INTRODUCER, CATHETER, HEMOSTASIS, FAST-CATH, 12 FR X 12 CM

## (undated) DEVICE — Device

## (undated) DEVICE — CATHETER, EPL, 7FR DUO, 2/8 2M 95CM, STEERABLE LGCRL

## (undated) DEVICE — CLOSURE SYSTEM, VASCADE MVP XL, 10-12FR

## (undated) DEVICE — CABLE, CONNECTOR, 10FT (REPROCESSED)

## (undated) DEVICE — SYSTEM, ACCESS, FXD DBL CURVE, WATCHMAN

## (undated) DEVICE — VERSACROSS KIT, ACCESS SOLUTION, RF WIRE 180CM

## (undated) DEVICE — CLOSURE SYSTEM, VASCULAR, MVP 6-12FR, VENOUS

## (undated) DEVICE — CATHETER, ACUSON ACUNAV ULTRASOUND, 8FR 90CM  (REPROCESSED)

## (undated) DEVICE — ELECTRODE KIT, ENSITE X EP SYSTEM SURFACE

## (undated) DEVICE — CATHETER, ABLATION, PULSED FIELD, FARAWAVE 31 MM

## (undated) DEVICE — SHEATH, STEERABLE, FARADRIVE, CLEAR

## (undated) DEVICE — CONNECTOR, CATHETER, RESPONSE, RED HEX

## (undated) DEVICE — CATHETER, DIAGNOSTIC, 6 FR-155 ANGLE INFINITI PIG

## (undated) DEVICE — CABLE CONNECTION, CATHETER, PFA RX-HRD

## (undated) DEVICE — INTRODUCER, SHEATH, FAST-CATH, 8FR X 12CM, C-LOCK

## (undated) DEVICE — ELECTRODE, QUICK-COMBO, REDI PACK